# Patient Record
Sex: MALE | Race: WHITE | NOT HISPANIC OR LATINO
[De-identification: names, ages, dates, MRNs, and addresses within clinical notes are randomized per-mention and may not be internally consistent; named-entity substitution may affect disease eponyms.]

---

## 2018-09-18 ENCOUNTER — NON-APPOINTMENT (OUTPATIENT)
Age: 58
End: 2018-09-18

## 2018-09-18 ENCOUNTER — APPOINTMENT (OUTPATIENT)
Dept: CARDIOLOGY | Facility: CLINIC | Age: 58
End: 2018-09-18
Payer: MEDICAID

## 2018-09-18 ENCOUNTER — OUTPATIENT (OUTPATIENT)
Dept: OUTPATIENT SERVICES | Facility: HOSPITAL | Age: 58
LOS: 1 days | End: 2018-09-18

## 2018-09-18 ENCOUNTER — INPATIENT (INPATIENT)
Facility: HOSPITAL | Age: 58
LOS: 0 days | Discharge: HOME | End: 2018-09-19
Attending: FAMILY MEDICINE
Payer: MEDICAID

## 2018-09-18 VITALS — DIASTOLIC BLOOD PRESSURE: 98 MMHG | SYSTOLIC BLOOD PRESSURE: 150 MMHG

## 2018-09-18 VITALS — HEART RATE: 148 BPM | OXYGEN SATURATION: 95 % | BODY MASS INDEX: 33.88 KG/M2 | HEIGHT: 71 IN | WEIGHT: 242 LBS

## 2018-09-18 DIAGNOSIS — Z82.5 FAMILY HISTORY OF ASTHMA AND OTHER CHRONIC LOWER RESPIRATORY DISEASES: ICD-10-CM

## 2018-09-18 DIAGNOSIS — R53.83 OTHER FATIGUE: ICD-10-CM

## 2018-09-18 DIAGNOSIS — Z81.2 FAMILY HISTORY OF TOBACCO ABUSE AND DEPENDENCE: ICD-10-CM

## 2018-09-18 DIAGNOSIS — Z86.59 PERSONAL HISTORY OF OTHER MENTAL AND BEHAVIORAL DISORDERS: ICD-10-CM

## 2018-09-18 DIAGNOSIS — Z87.19 PERSONAL HISTORY OF OTHER DISEASES OF THE DIGESTIVE SYSTEM: ICD-10-CM

## 2018-09-18 DIAGNOSIS — M72.2 PLANTAR FASCIAL FIBROMATOSIS: ICD-10-CM

## 2018-09-18 PROCEDURE — 71045 X-RAY EXAM CHEST 1 VIEW: CPT | Mod: 26

## 2018-09-18 PROCEDURE — 99245 OFF/OP CONSLTJ NEW/EST HI 55: CPT

## 2018-09-18 PROCEDURE — 99254 IP/OBS CNSLTJ NEW/EST MOD 60: CPT

## 2018-09-18 PROCEDURE — 99291 CRITICAL CARE FIRST HOUR: CPT

## 2018-09-18 PROCEDURE — 93000 ELECTROCARDIOGRAM COMPLETE: CPT

## 2018-09-18 RX ORDER — LEVOTHYROXINE SODIUM 0.05 MG/1
50 TABLET ORAL DAILY
Refills: 0 | Status: ACTIVE | COMMUNITY

## 2018-09-19 ENCOUNTER — OUTPATIENT (OUTPATIENT)
Dept: OUTPATIENT SERVICES | Facility: HOSPITAL | Age: 58
LOS: 1 days | End: 2018-09-19

## 2018-09-19 PROCEDURE — 93320 DOPPLER ECHO COMPLETE: CPT | Mod: 26

## 2018-09-19 PROCEDURE — 99233 SBSQ HOSP IP/OBS HIGH 50: CPT | Mod: 25

## 2018-09-19 PROCEDURE — 93312 ECHO TRANSESOPHAGEAL: CPT | Mod: 26

## 2018-09-19 PROCEDURE — 92960 CARDIOVERSION ELECTRIC EXT: CPT

## 2018-10-04 ENCOUNTER — APPOINTMENT (OUTPATIENT)
Dept: CARDIOLOGY | Facility: CLINIC | Age: 58
End: 2018-10-04
Payer: MEDICAID

## 2018-10-04 ENCOUNTER — NON-APPOINTMENT (OUTPATIENT)
Age: 58
End: 2018-10-04

## 2018-10-04 VITALS
WEIGHT: 230 LBS | HEIGHT: 71 IN | SYSTOLIC BLOOD PRESSURE: 116 MMHG | HEART RATE: 87 BPM | BODY MASS INDEX: 32.2 KG/M2 | DIASTOLIC BLOOD PRESSURE: 74 MMHG | OXYGEN SATURATION: 97 %

## 2018-10-04 DIAGNOSIS — R06.02 SHORTNESS OF BREATH: ICD-10-CM

## 2018-10-04 DIAGNOSIS — R94.5 ABNORMAL RESULTS OF LIVER FUNCTION STUDIES: ICD-10-CM

## 2018-10-04 PROCEDURE — 93000 ELECTROCARDIOGRAM COMPLETE: CPT

## 2018-10-04 PROCEDURE — 99214 OFFICE O/P EST MOD 30 MIN: CPT

## 2018-10-04 RX ORDER — CETIRIZINE HYDROCHLORIDE 10 MG/1
10 TABLET, COATED ORAL
Refills: 0 | Status: ACTIVE | COMMUNITY

## 2018-10-04 RX ORDER — MULTIVITAMIN
TABLET ORAL
Refills: 0 | Status: ACTIVE | COMMUNITY

## 2018-10-18 ENCOUNTER — MEDICATION RENEWAL (OUTPATIENT)
Age: 58
End: 2018-10-18

## 2018-11-01 ENCOUNTER — APPOINTMENT (OUTPATIENT)
Dept: CARDIOLOGY | Facility: CLINIC | Age: 58
End: 2018-11-01
Payer: MEDICAID

## 2018-11-01 PROCEDURE — 93015 CV STRESS TEST SUPVJ I&R: CPT

## 2018-11-01 PROCEDURE — A9502: CPT

## 2018-11-01 PROCEDURE — 78452 HT MUSCLE IMAGE SPECT MULT: CPT

## 2018-11-08 ENCOUNTER — APPOINTMENT (OUTPATIENT)
Dept: ELECTROPHYSIOLOGY | Facility: CLINIC | Age: 58
End: 2018-11-08
Payer: MEDICAID

## 2018-11-08 VITALS
WEIGHT: 230 LBS | HEIGHT: 71 IN | DIASTOLIC BLOOD PRESSURE: 78 MMHG | BODY MASS INDEX: 32.2 KG/M2 | HEART RATE: 92 BPM | SYSTOLIC BLOOD PRESSURE: 122 MMHG

## 2018-11-08 PROCEDURE — 93000 ELECTROCARDIOGRAM COMPLETE: CPT

## 2018-11-08 PROCEDURE — 99244 OFF/OP CNSLTJ NEW/EST MOD 40: CPT

## 2018-11-08 NOTE — REASON FOR VISIT
[FreeTextEntry1] : Eze España\par \par \par 57 year old male with h/o :\par \par 1.	Pre-DM diet controlled\par 2.	 LFTs/fatty liver\par 3.	HTN \par 4.	Plantar fascitis \par 5.	Atrial Flutter with rate 153bpm s/p SCOTTY and DCCV\par 6.	SCOTTY revealed no evidence of intracardiac thrombi, with preserved EF there was mild MR, moderate TR and grade 1-2 atherosclerosis of the aorta.\par \par pt lives in [place]. 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   His chest felt like a "washer machine".   He has also noticed on and off mild shortness of breath intermittently describes as easily winded.  No chest pain, dizziness, lightheadedness, syncope or edema. \par \par No PND, orthopnea, wheeze.\par \par He had history of ETOH abuse associated with his anxiety but denies any recent ETOH use.  Used OTC allergy medication withOUT decongestant, denies stimulant or caffeine use.   Denies drug use.\par He reports history of sleep apnea but states never had sleep study.\par \par  \par \par  Typical atrial flutter, had subsequent SCOTTY and DCCV, which was successful. \par \par SCOTTY revealed no evidence of intracardiac thrombi, with preserved EF there was mild MR, moderate TR and grade 1-2 atherosclerosis of the aorta.\par \par Labs revealed negative troponins, no significant anemia, there was significant elevation of his LFTs which is chronic for him. There was also mild elevation in TSH for which he is already on level thyroxine.\par \par \par \par He is tolerating Eliquis well without any bleeding or bruising complications.\par \par He is seeking to perhaps reduce some of his medications now that he has had successful cardioversion\par \par

## 2018-11-08 NOTE — ASSESSMENT
[FreeTextEntry1] : 57 year old with Pre-DM, ALISON?, HTN, hypothyroid with recent  Atrial flutter, now in NSR after DCCV remains s in SR no complaints.\par [id number] =1 (HTN) \par Recc:\par stbale for D/c of Eliquis\par LINQ monitor to determine need for Ablation and occult flutter recurrence\par CT Metoprolol 25 mg po daily\par

## 2018-11-08 NOTE — REVIEW OF SYSTEMS
[see HPI] : see HPI [Dyspnea on exertion] : dyspnea during exertion [Negative] : Heme/Lymph [Fever] : no fever [Headache] : no headache [Chills] : no chills [Feeling Fatigued] : not feeling fatigued [Earache] : no earache [Loss Of Hearing] : no hearing loss [Shortness Of Breath] : no shortness of breath [Chest  Pressure] : no chest pressure [Lower Ext Edema] : no extremity edema [Palpitations] : no palpitations

## 2018-11-08 NOTE — PHYSICAL EXAM
[General Appearance - Well Developed] : well developed [Normal Appearance] : normal appearance [Well Groomed] : well groomed [General Appearance - Well Nourished] : well nourished [No Deformities] : no deformities [General Appearance - In No Acute Distress] : no acute distress [Normal Conjunctiva] : the conjunctiva exhibited no abnormalities [Eyelids - No Xanthelasma] : the eyelids demonstrated no xanthelasmas [Normal Oral Mucosa] : normal oral mucosa [No Oral Pallor] : no oral pallor [No Oral Cyanosis] : no oral cyanosis [Normal Jugular Venous A Waves Present] : normal jugular venous A waves present [Normal Jugular Venous V Waves Present] : normal jugular venous V waves present [No Jugular Venous Gimenez A Waves] : no jugular venous gimenez A waves [Arterial Pulses Normal] : the arterial pulses were normal [Edema] : no peripheral edema present [Respiration, Rhythm And Depth] : normal respiratory rhythm and effort [Exaggerated Use Of Accessory Muscles For Inspiration] : no accessory muscle use [Lungs Percussion] : the lungs were normal to percussion [Bowel Sounds] : normal bowel sounds [Abdomen Soft] : soft [Abdomen Tenderness] : non-tender [Abdomen Mass (___ Cm)] : no abdominal mass palpated [Abnormal Walk] : normal gait [Gait - Sufficient For Exercise Testing] : the gait was sufficient for exercise testing [Skin Color & Pigmentation] : normal skin color and pigmentation [Skin Turgor] : normal skin turgor [] : no rash [Oriented To Time, Place, And Person] : oriented to person, place, and time [Affect] : the affect was normal [Mood] : the mood was normal [No Anxiety] : not feeling anxious [FreeTextEntry1] : Very pleasant

## 2018-11-13 ENCOUNTER — APPOINTMENT (OUTPATIENT)
Dept: CARDIOLOGY | Facility: CLINIC | Age: 58
End: 2018-11-13
Payer: MEDICAID

## 2018-11-13 VITALS
DIASTOLIC BLOOD PRESSURE: 88 MMHG | HEIGHT: 71 IN | WEIGHT: 230 LBS | BODY MASS INDEX: 32.2 KG/M2 | SYSTOLIC BLOOD PRESSURE: 138 MMHG | HEART RATE: 80 BPM

## 2018-11-13 DIAGNOSIS — E03.9 HYPOTHYROIDISM, UNSPECIFIED: ICD-10-CM

## 2018-11-13 PROCEDURE — 99214 OFFICE O/P EST MOD 30 MIN: CPT

## 2018-11-13 NOTE — REASON FOR VISIT
[FreeTextEntry1] : 57 year old male with past medical history of pre-DM diet controlled, elevated LFTs/fatty liver,  HTN recently placed on BP medication.    Pt  is also being treated for plantar fascitis and while on meloxicam noticed worsening of BP.  He has recent diagnosis of ALISON, pending CPAP.\par \par He presented intially for tachycardia and HTN with BP reading of "160/108". \par EKG from PMD office revealed Atrial Flutter with rate 153bpm. \par EKG repeated in our office- A. Flutter PVCs with uncontrolled torey. rate 154bpm.\par \par Pt admited he had a one month history that he has been feeling more fatigued and lethargic.   His chest felt like a "washer machine".   He has also noticed on and off mild shortness of breath intermittently describes as easily winded.  No chest pain, dizziness, lightheadedness, syncope or edema. \par \par No PND, orthopnea, wheeze.\par \par He had history of ETOH abuse associated with his anxiety but denies any recent ETOH use.  Used OTC allergy medication withOUT decongestant, denies stimulant or caffeine use.   Denies drug use.\par \par \par  \par No history of CVA, MI, CHF.\par \par \par Following evaluation in our office he was sent to [facility] where he was noted to be in typical atrial flutter, had subsequent SCOTTY and DCCV, which was successful. \par \par SCOTTY revealed no evidence of intracardiac thrombi, with preserved EF there was mild MR, moderate TR and grade 1-2 atherosclerosis of the aorta.\par \par Labs revealed negative troponins, no significant anemia, there was significant elevation of his LFTs which is chronic for him. There was also mild elevation in TSH for which he is already on level thyroxine.\par \par In followup EKG revealed normal sinus rhythm.\par \par He reports interval significant improvement in his symptoms and is no longer dyspneic, nor having palpitations. He is relatively sedentary person, and is not having chest discomfort with exertion.\par \par He denies other symptoms to include dizziness, syncope or near syncope, PND, orthopnea, jaw pain.\par \par In the interim he had consultation with Dr. Gomes. NOAC has been replaced with ASA, and is planned for ILR to determine if there is occult a. flutter, and determine the need for future rhythm strategy.\par \par Nuclear stress testing November 1, 2018 was negative for ischemia.  \par \par Social hx: pt lives in [place].  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

## 2018-11-13 NOTE — ASSESSMENT
[FreeTextEntry1] : 58 year old with Pre-DM, ALISON?, HTN, hypothyroid with recent  Atrial flutter, now in NSR after DCCV and normal SCOTTY.\par \par CHADS VASC 2 score 1 now on ASA.\par \par \par He was seen by electrophysiology, Dr. Gomes, and will have ILR implant and subsequent evaluation discuss rhythm guided strategy if there is occult AF/flutter.\par \par Hypertension is presently well controlled. Continue metoprolol tartrate to 25 mg b.i.d. He reports BP readings at home are well controlled. \par \par Given significant LFT elevation, I have recommended that he discontinue or extensively limit his use of nonsteroidal anti-inflammatories with reassessment of his LFTs thereafter. He will pursue this with his PCP.\par \par Sleep apnea, will start CPAP next week.\par \par He should also avoid any stimulant substances, alcohol, caffeine, decongestants.\par \par Continue followup with his primary care physician for monitoring of prediabetes.\par \par He will follow up in the next 4-6 weeks or as needed. \par

## 2018-11-13 NOTE — REVIEW OF SYSTEMS
[see HPI] : see HPI [Negative] : Heme/Lymph [Fever] : no fever [Headache] : no headache [Chills] : no chills [Feeling Fatigued] : not feeling fatigued [Earache] : no earache [Loss Of Hearing] : no hearing loss [Shortness Of Breath] : no shortness of breath [Dyspnea on exertion] : not dyspnea during exertion [Chest  Pressure] : no chest pressure [Lower Ext Edema] : no extremity edema [Palpitations] : no palpitations

## 2018-11-13 NOTE — PHYSICAL EXAM
[General Appearance - Well Developed] : well developed [Normal Appearance] : normal appearance [Well Groomed] : well groomed [General Appearance - Well Nourished] : well nourished [No Deformities] : no deformities [General Appearance - In No Acute Distress] : no acute distress [Normal Conjunctiva] : the conjunctiva exhibited no abnormalities [Eyelids - No Xanthelasma] : the eyelids demonstrated no xanthelasmas [Normal Oral Mucosa] : normal oral mucosa [No Oral Pallor] : no oral pallor [No Oral Cyanosis] : no oral cyanosis [Normal Jugular Venous A Waves Present] : normal jugular venous A waves present [Normal Jugular Venous V Waves Present] : normal jugular venous V waves present [No Jugular Venous Gimenez A Waves] : no jugular venous gimenez A waves [Respiration, Rhythm And Depth] : normal respiratory rhythm and effort [Exaggerated Use Of Accessory Muscles For Inspiration] : no accessory muscle use [Lungs Percussion] : the lungs were normal to percussion [Arterial Pulses Normal] : the arterial pulses were normal [Edema] : no peripheral edema present [Bowel Sounds] : normal bowel sounds [Abdomen Soft] : soft [Abdomen Tenderness] : non-tender [Abdomen Mass (___ Cm)] : no abdominal mass palpated [Abnormal Walk] : normal gait [Gait - Sufficient For Exercise Testing] : the gait was sufficient for exercise testing [Skin Color & Pigmentation] : normal skin color and pigmentation [Skin Turgor] : normal skin turgor [] : no rash [Oriented To Time, Place, And Person] : oriented to person, place, and time [Affect] : the affect was normal [Mood] : the mood was normal [No Anxiety] : not feeling anxious [FreeTextEntry1] : Very pleasant

## 2018-12-19 ENCOUNTER — OUTPATIENT (OUTPATIENT)
Dept: OUTPATIENT SERVICES | Facility: HOSPITAL | Age: 58
LOS: 1 days | End: 2018-12-19
Payer: MEDICAID

## 2018-12-19 PROCEDURE — 33282: CPT

## 2018-12-20 RX ORDER — ASPIRIN 81 MG
81 TABLET, DELAYED RELEASE (ENTERIC COATED) ORAL
Refills: 0 | Status: DISCONTINUED | COMMUNITY
End: 2018-12-20

## 2019-01-04 ENCOUNTER — APPOINTMENT (OUTPATIENT)
Dept: CARDIOLOGY | Facility: CLINIC | Age: 59
End: 2019-01-04
Payer: MEDICAID

## 2019-01-04 PROCEDURE — 93291 INTERROG DEV EVAL SCRMS IP: CPT

## 2019-01-04 NOTE — PROCEDURE
[de-identified] : NADIYA العراقيQ\par \par Date of implant 12-19-18\par Dx: A flutter\par \par Settings:\par Tachy 171 x 16 beats\par Pause x 3 s\par Roberto 30 bpm x 4 beats\par AF on \par \par Findings:\par Episodes 1-12 viewed, all pauses in sleep, up to 5 s.\par Will reduced metoprolol to 25 mg BID ( was recently increased due to HTN.)\par \par Check BP in 2 weeks.\par Continue to monitor monthly.\par \par

## 2019-01-10 ENCOUNTER — APPOINTMENT (OUTPATIENT)
Dept: ELECTROPHYSIOLOGY | Facility: CLINIC | Age: 59
End: 2019-01-10

## 2019-01-28 ENCOUNTER — OUTPATIENT (OUTPATIENT)
Dept: OUTPATIENT SERVICES | Facility: HOSPITAL | Age: 59
LOS: 1 days | End: 2019-01-28
Payer: MEDICAID

## 2019-01-28 VITALS
RESPIRATION RATE: 18 BRPM | HEART RATE: 92 BPM | HEIGHT: 71 IN | TEMPERATURE: 98 F | SYSTOLIC BLOOD PRESSURE: 167 MMHG | WEIGHT: 235.89 LBS | OXYGEN SATURATION: 94 % | DIASTOLIC BLOOD PRESSURE: 96 MMHG

## 2019-01-28 VITALS
DIASTOLIC BLOOD PRESSURE: 96 MMHG | OXYGEN SATURATION: 94 % | TEMPERATURE: 98 F | HEART RATE: 92 BPM | RESPIRATION RATE: 18 BRPM | SYSTOLIC BLOOD PRESSURE: 167 MMHG

## 2019-01-28 DIAGNOSIS — Z90.89 ACQUIRED ABSENCE OF OTHER ORGANS: Chronic | ICD-10-CM

## 2019-01-28 DIAGNOSIS — I48.92 UNSPECIFIED ATRIAL FLUTTER: ICD-10-CM

## 2019-01-28 DIAGNOSIS — Z98.890 OTHER SPECIFIED POSTPROCEDURAL STATES: Chronic | ICD-10-CM

## 2019-01-28 DIAGNOSIS — Z01.818 ENCOUNTER FOR OTHER PREPROCEDURAL EXAMINATION: ICD-10-CM

## 2019-01-28 LAB
ALBUMIN SERPL ELPH-MCNC: 4.2 G/DL — SIGNIFICANT CHANGE UP (ref 3.3–5.2)
ALP SERPL-CCNC: 64 U/L — SIGNIFICANT CHANGE UP (ref 40–120)
ALT FLD-CCNC: 48 U/L — HIGH
ANION GAP SERPL CALC-SCNC: 16 MMOL/L — SIGNIFICANT CHANGE UP (ref 5–17)
APTT BLD: 34.6 SEC — SIGNIFICANT CHANGE UP (ref 27.5–36.3)
AST SERPL-CCNC: 31 U/L — SIGNIFICANT CHANGE UP
BILIRUB SERPL-MCNC: 0.4 MG/DL — SIGNIFICANT CHANGE UP (ref 0.4–2)
BLD GP AB SCN SERPL QL: SIGNIFICANT CHANGE UP
BUN SERPL-MCNC: 25 MG/DL — HIGH (ref 8–20)
CALCIUM SERPL-MCNC: 9.3 MG/DL — SIGNIFICANT CHANGE UP (ref 8.6–10.2)
CHLORIDE SERPL-SCNC: 97 MMOL/L — LOW (ref 98–107)
CO2 SERPL-SCNC: 26 MMOL/L — SIGNIFICANT CHANGE UP (ref 22–29)
CREAT SERPL-MCNC: 0.84 MG/DL — SIGNIFICANT CHANGE UP (ref 0.5–1.3)
GLUCOSE SERPL-MCNC: 125 MG/DL — HIGH (ref 70–115)
HCT VFR BLD CALC: 45.5 % — SIGNIFICANT CHANGE UP (ref 42–52)
HGB BLD-MCNC: 15.7 G/DL — SIGNIFICANT CHANGE UP (ref 14–18)
INR BLD: 1.22 RATIO — HIGH (ref 0.88–1.16)
MAGNESIUM SERPL-MCNC: 2 MG/DL — SIGNIFICANT CHANGE UP (ref 1.8–2.6)
MCHC RBC-ENTMCNC: 33.1 PG — HIGH (ref 27–31)
MCHC RBC-ENTMCNC: 34.5 G/DL — SIGNIFICANT CHANGE UP (ref 32–36)
MCV RBC AUTO: 96 FL — HIGH (ref 80–94)
PLATELET # BLD AUTO: 240 K/UL — SIGNIFICANT CHANGE UP (ref 150–400)
POTASSIUM SERPL-MCNC: 3.4 MMOL/L — LOW (ref 3.5–5.3)
POTASSIUM SERPL-SCNC: 3.4 MMOL/L — LOW (ref 3.5–5.3)
PROT SERPL-MCNC: 7.2 G/DL — SIGNIFICANT CHANGE UP (ref 6.6–8.7)
PROTHROM AB SERPL-ACNC: 14.1 SEC — HIGH (ref 10–12.9)
RBC # BLD: 4.74 M/UL — SIGNIFICANT CHANGE UP (ref 4.6–6.2)
RBC # FLD: 12.2 % — SIGNIFICANT CHANGE UP (ref 11–15.6)
SODIUM SERPL-SCNC: 139 MMOL/L — SIGNIFICANT CHANGE UP (ref 135–145)
TYPE + AB SCN PNL BLD: SIGNIFICANT CHANGE UP
WBC # BLD: 8.8 K/UL — SIGNIFICANT CHANGE UP (ref 4.8–10.8)
WBC # FLD AUTO: 8.8 K/UL — SIGNIFICANT CHANGE UP (ref 4.8–10.8)

## 2019-01-28 PROCEDURE — 36415 COLL VENOUS BLD VENIPUNCTURE: CPT

## 2019-01-28 PROCEDURE — G0463: CPT

## 2019-01-28 PROCEDURE — 93005 ELECTROCARDIOGRAM TRACING: CPT

## 2019-01-28 PROCEDURE — 86900 BLOOD TYPING SEROLOGIC ABO: CPT

## 2019-01-28 PROCEDURE — 85730 THROMBOPLASTIN TIME PARTIAL: CPT

## 2019-01-28 PROCEDURE — 85610 PROTHROMBIN TIME: CPT

## 2019-01-28 PROCEDURE — 86850 RBC ANTIBODY SCREEN: CPT

## 2019-01-28 PROCEDURE — 83735 ASSAY OF MAGNESIUM: CPT

## 2019-01-28 PROCEDURE — 93010 ELECTROCARDIOGRAM REPORT: CPT

## 2019-01-28 PROCEDURE — 80053 COMPREHEN METABOLIC PANEL: CPT

## 2019-01-28 PROCEDURE — 86901 BLOOD TYPING SEROLOGIC RH(D): CPT

## 2019-01-28 PROCEDURE — 85027 COMPLETE CBC AUTOMATED: CPT

## 2019-01-28 RX ORDER — METOPROLOL TARTRATE 50 MG
1 TABLET ORAL
Qty: 0 | Refills: 0 | COMMUNITY

## 2019-01-28 NOTE — H&P PST ADULT - ATTENDING COMMENTS
57 yo gentleman w/pmh of HTN, obstructive sleep apnea, pre-DM diet controlled, depression, anxiety, hypothyroidism and recently diagnosed atrial flutter hospitalized at [facility] 11/2018.  He is s/p successful SCOTTY/DCCV (11/2018) with subsequent ILR implanted 12/19/18 ([facility]) for long term arrhythmia surveillance.  Pt presents today for PST for elective atrial flutter ablation secondary to poorly controlled recurrent atrial flutter.  Pt denies any complaints at this time.  Denies headache, dizziness, chest pain, palpitation, shortness of breath.       Patient stable for planned procedure.    I had a lengthy discussion with the patient in lay terms regarding the various treatment options available for this condition. The details and benefits of the procedure were explained. The risks including but not limited to, bleeding, heart/lung/vessel damage, respiratory distress, stroke, blood clot, infection, lead dislodgement, equipment malfunction/device recall, skin damage, heart attack, drug reaction, alternation of heart rhythm, cardiac arrest and even death were discussed in great detail. In addition, it was made clear to the patient that any of the above complications may require intervention or a surgical repair and may prolong hospital stay and/or potentially result in permanent impairment. During this conversation, the patient demonstrated a clear understanding of the facts, implications and consequences of the procedure. The patient had ample opportunities to ask questions. The patient decided to go ahead with the procedure rather than with alternative therapies.

## 2019-01-28 NOTE — H&P PST ADULT - PMH
Anxiety    Depression, unspecified depression type    Hypertension, unspecified type    Hypothyroidism, unspecified type    Obstructive sleep apnea

## 2019-01-28 NOTE — H&P PST ADULT - ASSESSMENT
59 yo gentleman w/pmh of HTN, obstructive sleep apnea, pre-DM diet controlled, depression, anxiety, hypothyroidism and recently diagnosed atrial flutter hospitalized at [facility] 11/2018.  He is s/p successful SCOTTY/DCCV (11/2018) with subsequent ILR implanted 12/19/18 ([facility]) for long term arrhythmia surveillance.  Pt presents today for PST for elective atrial flutter ablation secondary to reported recurrent atrial flutter.  Pt is maintained on metoprolol and eliquis.      Plan:   -NPO after midnight on 1/31/19 for procedure on 2/1/19 @ 8am  -Pt advised last dose of eliquis on 1/31/19 morning, no bridge    -SCOTTY on table  -bring CPAP day of procedure

## 2019-01-28 NOTE — H&P PST ADULT - HISTORY OF PRESENT ILLNESS
59 yo gentleman w/pmh of HTN, obstructive sleep apnea, pre-DM diet controlled, depression, anxiety, hypothyroidism and recently diagnosed atrial flutter hospitalized at [facility] 11/2018.  He is s/p ssuccessful SCOTTY/DCCV (11/2018) with subsequent ILR implanted 12/19/18 ([facility]) for long term arrhythmia surveillance.  Pt presents today for PST for elective atrial flutter ablation secondary to reported recurrent atrial flutter.  Pt denies any complaints at this time.  Denies headache, dizziness, chest pain, palpitation, shortness of breath. 57 yo gentleman w/pmh of HTN, obstructive sleep apnea, pre-DM diet controlled, depression, anxiety, hypothyroidism and recently diagnosed atrial flutter hospitalized at [facility] 11/2018.  He is s/p ssuccessful SCOTTY/DCCV (11/2018) with subsequent ILR implanted 12/19/18 ([facility]) for long term arrhythmia surveillance.  Pt presents today for PST for elective atrial flutter ablation secondary to poorly controlled recurrent atrial flutter.  Pt denies any complaints at this time.  Denies headache, dizziness, chest pain, palpitation, shortness of breath.

## 2019-02-01 ENCOUNTER — INPATIENT (INPATIENT)
Facility: HOSPITAL | Age: 59
LOS: 0 days | Discharge: HOME | DRG: 274 | End: 2019-02-02
Attending: INTERNAL MEDICINE | Admitting: INTERNAL MEDICINE
Payer: MEDICAID

## 2019-02-01 ENCOUNTER — TRANSCRIPTION ENCOUNTER (OUTPATIENT)
Age: 59
End: 2019-02-01

## 2019-02-01 VITALS
SYSTOLIC BLOOD PRESSURE: 166 MMHG | RESPIRATION RATE: 18 BRPM | TEMPERATURE: 98 F | OXYGEN SATURATION: 96 % | DIASTOLIC BLOOD PRESSURE: 105 MMHG | HEART RATE: 92 BPM

## 2019-02-01 DIAGNOSIS — Z90.89 ACQUIRED ABSENCE OF OTHER ORGANS: Chronic | ICD-10-CM

## 2019-02-01 DIAGNOSIS — I48.92 UNSPECIFIED ATRIAL FLUTTER: ICD-10-CM

## 2019-02-01 DIAGNOSIS — Z98.890 OTHER SPECIFIED POSTPROCEDURAL STATES: Chronic | ICD-10-CM

## 2019-02-01 DIAGNOSIS — Z01.818 ENCOUNTER FOR OTHER PREPROCEDURAL EXAMINATION: ICD-10-CM

## 2019-02-01 LAB
ABO RH CONFIRMATION: SIGNIFICANT CHANGE UP
ANION GAP SERPL CALC-SCNC: 18 MMOL/L — HIGH (ref 5–17)
BUN SERPL-MCNC: 20 MG/DL — SIGNIFICANT CHANGE UP (ref 8–20)
CALCIUM SERPL-MCNC: 10 MG/DL — SIGNIFICANT CHANGE UP (ref 8.6–10.2)
CHLORIDE SERPL-SCNC: 99 MMOL/L — SIGNIFICANT CHANGE UP (ref 98–107)
CO2 SERPL-SCNC: 25 MMOL/L — SIGNIFICANT CHANGE UP (ref 22–29)
CREAT SERPL-MCNC: 0.76 MG/DL — SIGNIFICANT CHANGE UP (ref 0.5–1.3)
GLUCOSE SERPL-MCNC: 127 MG/DL — HIGH (ref 70–115)
POTASSIUM SERPL-MCNC: 3.9 MMOL/L — SIGNIFICANT CHANGE UP (ref 3.5–5.3)
POTASSIUM SERPL-SCNC: 3.9 MMOL/L — SIGNIFICANT CHANGE UP (ref 3.5–5.3)
SODIUM SERPL-SCNC: 142 MMOL/L — SIGNIFICANT CHANGE UP (ref 135–145)

## 2019-02-01 PROCEDURE — 93613 INTRACARDIAC EPHYS 3D MAPG: CPT | Mod: 78

## 2019-02-01 PROCEDURE — 93655 ICAR CATH ABLTJ DSCRT ARRHYT: CPT | Mod: 78

## 2019-02-01 PROCEDURE — 93653 COMPRE EP EVAL TX SVT: CPT

## 2019-02-01 PROCEDURE — 93010 ELECTROCARDIOGRAM REPORT: CPT

## 2019-02-01 PROCEDURE — 93621 COMP EP EVL L PAC&REC C SINS: CPT | Mod: 26

## 2019-02-01 RX ORDER — LEVOTHYROXINE SODIUM 125 MCG
1 TABLET ORAL
Qty: 0 | Refills: 0 | COMMUNITY

## 2019-02-01 RX ORDER — METOPROLOL TARTRATE 50 MG
25 TABLET ORAL
Qty: 0 | Refills: 0 | Status: DISCONTINUED | OUTPATIENT
Start: 2019-02-01 | End: 2019-02-02

## 2019-02-01 RX ORDER — BENZOCAINE AND MENTHOL 5; 1 G/100ML; G/100ML
1 LIQUID ORAL
Qty: 0 | Refills: 0 | Status: DISCONTINUED | OUTPATIENT
Start: 2019-02-01 | End: 2019-02-02

## 2019-02-01 RX ORDER — MIRTAZAPINE 45 MG/1
1 TABLET, ORALLY DISINTEGRATING ORAL
Qty: 0 | Refills: 0 | COMMUNITY

## 2019-02-01 RX ORDER — ACETAMINOPHEN 500 MG
650 TABLET ORAL EVERY 6 HOURS
Qty: 0 | Refills: 0 | Status: DISCONTINUED | OUTPATIENT
Start: 2019-02-01 | End: 2019-02-02

## 2019-02-01 RX ORDER — GABAPENTIN 400 MG/1
300 CAPSULE ORAL THREE TIMES A DAY
Qty: 0 | Refills: 0 | Status: DISCONTINUED | OUTPATIENT
Start: 2019-02-01 | End: 2019-02-02

## 2019-02-01 RX ORDER — FUROSEMIDE 40 MG
10 TABLET ORAL ONCE
Qty: 0 | Refills: 0 | Status: COMPLETED | OUTPATIENT
Start: 2019-02-01 | End: 2019-02-01

## 2019-02-01 RX ORDER — LEVOTHYROXINE SODIUM 125 MCG
50 TABLET ORAL DAILY
Qty: 0 | Refills: 0 | Status: DISCONTINUED | OUTPATIENT
Start: 2019-02-01 | End: 2019-02-02

## 2019-02-01 RX ORDER — CHLORTHALIDONE 50 MG
1 TABLET ORAL
Qty: 0 | Refills: 0 | COMMUNITY

## 2019-02-01 RX ORDER — FENTANYL CITRATE 50 UG/ML
50 INJECTION INTRAVENOUS
Qty: 0 | Refills: 0 | Status: DISCONTINUED | OUTPATIENT
Start: 2019-02-01 | End: 2019-02-02

## 2019-02-01 RX ORDER — APIXABAN 2.5 MG/1
5 TABLET, FILM COATED ORAL
Qty: 0 | Refills: 0 | Status: DISCONTINUED | OUTPATIENT
Start: 2019-02-01 | End: 2019-02-02

## 2019-02-01 RX ORDER — METOPROLOL TARTRATE 50 MG
1 TABLET ORAL
Qty: 0 | Refills: 0 | COMMUNITY

## 2019-02-01 RX ORDER — HYDROCHLOROTHIAZIDE 25 MG
25 TABLET ORAL DAILY
Qty: 0 | Refills: 0 | Status: DISCONTINUED | OUTPATIENT
Start: 2019-02-01 | End: 2019-02-02

## 2019-02-01 RX ORDER — OMEGA-3 ACID ETHYL ESTERS 1 G
1 CAPSULE ORAL
Qty: 0 | Refills: 0 | COMMUNITY

## 2019-02-01 RX ORDER — APIXABAN 2.5 MG/1
1 TABLET, FILM COATED ORAL
Qty: 0 | Refills: 0 | COMMUNITY

## 2019-02-01 RX ORDER — ALPRAZOLAM 0.25 MG
0.25 TABLET ORAL EVERY 6 HOURS
Qty: 0 | Refills: 0 | Status: DISCONTINUED | OUTPATIENT
Start: 2019-02-01 | End: 2019-02-02

## 2019-02-01 RX ORDER — GABAPENTIN 400 MG/1
1 CAPSULE ORAL
Qty: 0 | Refills: 0 | COMMUNITY

## 2019-02-01 RX ORDER — MIRTAZAPINE 45 MG/1
15 TABLET, ORALLY DISINTEGRATING ORAL AT BEDTIME
Qty: 0 | Refills: 0 | Status: DISCONTINUED | OUTPATIENT
Start: 2019-02-01 | End: 2019-02-02

## 2019-02-01 RX ADMIN — APIXABAN 5 MILLIGRAM(S): 2.5 TABLET, FILM COATED ORAL at 17:58

## 2019-02-01 RX ADMIN — Medication 25 MILLIGRAM(S): at 17:59

## 2019-02-01 RX ADMIN — Medication 10 MILLIGRAM(S): at 17:58

## 2019-02-01 RX ADMIN — MIRTAZAPINE 15 MILLIGRAM(S): 45 TABLET, ORALLY DISINTEGRATING ORAL at 23:28

## 2019-02-01 NOTE — DISCHARGE NOTE ADULT - CARE PROVIDER_API CALL
Dalton Gomes (MD; MPH)  Cardiac Electrophysiology; Cardiovascular Disease; Internal Medicine  [address] [place] [address] [id number]  Phone: [phone]  Fax: [phone]

## 2019-02-01 NOTE — PROGRESS NOTE ADULT - SUBJECTIVE AND OBJECTIVE BOX
Pt presents for elective AFL ablation. Denies changes in health status or medication regimen to date.   Pt does report he drove himself here. SW contact - pt is eligible for transport home via cab through Medicaid. He states he will be able to get a ride to  his car later in the week.   Pt is also noted to be in sinus rhythm, which may eliminate the need for SCOTTY.   Will discuss above w/ Dr. Gomes.

## 2019-02-01 NOTE — PROGRESS NOTE ADULT - SUBJECTIVE AND OBJECTIVE BOX
PROCEDURE(S): Radiofrequency Ablation of Typical Atrial Flutter and Right Atrial Tachycardia    ELECTRPHYSIOLOGIST(S): Dalton Gomes MD         COMPLICATIONS:  none        DISPOSITION:  observation     CONDITION: stable      Pt doing well s/p atrial flutter and belen tach ablation via right femoral venous access. Denies complaint.       MEDICATIONS  (STANDING):  apixaban 5 milliGRAM(s) Oral <User Schedule>  furosemide   Injectable 10 milliGRAM(s) IV Push once  hydrochlorothiazide 25 milliGRAM(s) Oral daily  levothyroxine 50 MICROGram(s) Oral daily  metoprolol tartrate 25 milliGRAM(s) Oral two times a day  mirtazapine 15 milliGRAM(s) Oral at bedtime    MEDICATIONS  (PRN):  acetaminophen   Tablet .. 650 milliGRAM(s) Oral every 6 hours PRN Mild Pain (1 - 3)  ALPRAZolam 0.25 milliGRAM(s) Oral every 6 hours PRN anxiety and/or insomnia  aluminum hydroxide/magnesium hydroxide/simethicone Suspension 30 milliLiter(s) Oral every 4 hours PRN Dyspepsia  benzocaine 15 mG/menthol 3.6 mG (Sugar-Free) Lozenge 1 Lozenge Oral every 2 hours PRN Sore Throat  fentaNYL    Injectable 50 MICROGram(s) IV Push every 30 minutes PRN Severe Pain (7 - 10)  gabapentin 300 milliGRAM(s) Oral three times a day PRN nerve pain      Allergies  No Known Allergies      Exam:   T(C): 36.7 (02-01-19 @ 07:50), Max: 36.7 (02-01-19 @ 07:50)  HR: 95 (02-01-19 @ 14:30) (86 - 96)  BP: 127/78 (02-01-19 @ 14:30) (90/65 - 166/105)  RR: 14 (02-01-19 @ 14:30) (14 - 18)  SpO2: 94% (02-01-19 @ 14:30) (94% - 98%)  Wt(kg): --  VSS, NAD, A&O x 3  Card: S1/S2, RRR, no m/g/r  Resp: lungs CTA b/l  Abd: S/NT/ND  Groins: hemostatic sutures in place; sites C/D/I; no bleeding, hematoma, erythema, exudate or edema  Ext: no edema; distal pulses intact      ECG: sinus rhythm w/ intact conduction, incomplete RBBB; no acute changes.     Assessment:   59 yo gentleman w/pmh of HTN, obstructive sleep apnea, pre-DM diet controlled, depression, anxiety, hypothyroidism and recently diagnosed atrial flutter hospitalized at [facility] 11/2018.  He is s/p successful SCOTTY/DCCV (11/2018) with subsequent ILR implanted 12/19/18 ([facility]) for long term arrhythmia surveillance. Now status post uncomplicated radiofrequency ablation of atrial fibrillation.      Plan:   Bedrest x 4 hours, then OOB with assistance and progress as tolerated.   Lasix 10mg IV x 1 dose once pt able to ambulate.   Groin suture to be removed by EP service in AM.   Pending groin status: Eliquis 5mg PO q 12 hours to resume at 1800 tonight.   Continue other home medications.   Strict I/Os.  Please encourage incentive spirometry and ambulation once able.  Observation and monitoring on telemetry overnight with anticipated discharge in the AM and outpt follow up in 2-4 weeks. PROCEDURE(S): Radiofrequency Ablation of Typical Atrial Flutter and Right Atrial Tachycardia    ELECTRPHYSIOLOGIST(S): Dalton Gomes MD         COMPLICATIONS:  none        DISPOSITION:  observation     CONDITION: stable      Pt doing well s/p atrial flutter and belen tach ablation via right femoral venous access. Denies complaint.       MEDICATIONS  (STANDING):  apixaban 5 milliGRAM(s) Oral <User Schedule>  furosemide   Injectable 10 milliGRAM(s) IV Push once  hydrochlorothiazide 25 milliGRAM(s) Oral daily  levothyroxine 50 MICROGram(s) Oral daily  metoprolol tartrate 25 milliGRAM(s) Oral two times a day  mirtazapine 15 milliGRAM(s) Oral at bedtime    MEDICATIONS  (PRN):  acetaminophen   Tablet .. 650 milliGRAM(s) Oral every 6 hours PRN Mild Pain (1 - 3)  ALPRAZolam 0.25 milliGRAM(s) Oral every 6 hours PRN anxiety and/or insomnia  aluminum hydroxide/magnesium hydroxide/simethicone Suspension 30 milliLiter(s) Oral every 4 hours PRN Dyspepsia  benzocaine 15 mG/menthol 3.6 mG (Sugar-Free) Lozenge 1 Lozenge Oral every 2 hours PRN Sore Throat  fentaNYL    Injectable 50 MICROGram(s) IV Push every 30 minutes PRN Severe Pain (7 - 10)  gabapentin 300 milliGRAM(s) Oral three times a day PRN nerve pain      Allergies  No Known Allergies      Exam:   T(C): 36.7 (02-01-19 @ 07:50), Max: 36.7 (02-01-19 @ 07:50)  HR: 95 (02-01-19 @ 14:30) (86 - 96)  BP: 127/78 (02-01-19 @ 14:30) (90/65 - 166/105)  RR: 14 (02-01-19 @ 14:30) (14 - 18)  SpO2: 94% (02-01-19 @ 14:30) (94% - 98%)    VSS, NAD, A&O x 3  Card: S1/S2, RRR, no m/g/r  Resp: lungs CTA b/l  Abd: S/NT/ND  Groin: hemostatic suture in place; sites C/D/I; no bleeding, hematoma, erythema, exudate or edema  Ext: no edema; distal pulses intact      ECG: sinus rhythm w/ intact conduction, incomplete RBBB; no acute changes.     Assessment:   57 yo gentleman w/pmh of HTN, obstructive sleep apnea, pre-DM (diet controlled), depression, anxiety, hypothyroidism and recently diagnosed atrial flutter s/p SCOTTY/DCCV (11/2018)and ILR implanted (12/19/18). On routine ILR interrogation, he was noted to have recurrent paroxysmal atrial flutter. Now status post uncomplicated radiofrequency ablation of atrial flutter and right atrial (belen) tach.       Plan:   Bedrest x 4 hours, then OOB with assistance and progress as tolerated.   Lasix 10mg IV x 1 dose once pt able to ambulate.   Groin suture to be removed by EP service in AM.   Pending groin status: Eliquis 5mg PO q 12 hours to resume at 1800 tonight.   Continue other home medications.   Strict I/Os.  Please encourage incentive spirometry and ambulation once able.  Observation and monitoring on telemetry overnight with anticipated discharge in the AM and outpt follow up in 2-4 weeks.

## 2019-02-01 NOTE — DISCHARGE NOTE ADULT - PATIENT PORTAL LINK FT
You can access the [facility] Patient Portal, offered by [facility], by registering with the following website: [email or web address]

## 2019-02-01 NOTE — PROGRESS NOTE ADULT - SUBJECTIVE AND OBJECTIVE BOX
Admission Criteria  Please admit the patient to the following service: CARDIOLOGY  Major Criteria:  - Significant volume load > 200 ml    Admit to: 3GUL     Patient is being admitted to the inpatient service due to high risk characteristics and need for further management/monitoring and is considered to be at a significantly increased risk of major adverse cardiac and vascular events if discharged.

## 2019-02-01 NOTE — DISCHARGE NOTE ADULT - MEDICATION SUMMARY - MEDICATIONS TO TAKE
I will START or STAY ON the medications listed below when I get home from the hospital:    Eliquis 5 mg oral tablet  -- 1 tab(s) by mouth once a day  -- Indication: For Atrial flutter    gabapentin 300 mg oral capsule  -- 1 cap(s) by mouth 3 times a day, As Needed  -- Indication: For Anticonvulsant    mirtazapine 15 mg oral tablet  -- 1 tab(s) by mouth once a day (at bedtime)  -- Indication: For Anti depressant    Metoprolol Tartrate 25 mg oral tablet  -- 1 tab(s) by mouth 2 times a day  -- Indication: For beta blocker    chlorthalidone 25 mg oral tablet  -- 1 tab(s) by mouth once a day  -- Indication: For Diuretic    potassium chloride 20 mEq oral tablet, extended release  -- 2 tab(s) by mouth every 4 hours  -- Indication: For Supplement    Fish Oil 1000 mg oral capsule  -- 1 cap(s) by mouth 2 times a day  -- Indication: For SUpplement    levothyroxine 50 mcg (0.05 mg) oral tablet  -- 1 tab(s) by mouth once a day  -- Indication: For Thyroid disease

## 2019-02-01 NOTE — DISCHARGE NOTE ADULT - ADDITIONAL INSTRUCTIONS
Follow up with Dr. Gomes in 2-3 weeks. Our office will contact you in 3-5 days to schedule this appointment. Please call [phone] with questions or concerns.

## 2019-02-01 NOTE — DISCHARGE NOTE ADULT - HOSPITAL COURSE
57 yo gentleman w/pmh of HTN, obstructive sleep apnea, pre-DM (diet controlled), depression, anxiety, hypothyroidism and recently diagnosed atrial flutter s/p SCOTTY/DCCV (11/2018)and ILR implanted (12/19/18). On routine ILR interrogation, he was noted to have recurrent paroxysmal atrial flutter and atrial tachycardia. He presented electively 2/1/19 and underwent uncomplicated radiofrequency ablation of atrial flutter and right atrial (belen) tachycardia. 59 yo gentleman w/pmh of HTN, obstructive sleep apnea, pre-DM (diet controlled), depression, anxiety, hypothyroidism and recently diagnosed atrial flutter s/p SCOTTY/DCCV (11/2018)and ILR implanted (12/19/18). On routine ILR interrogation, he was noted to have recurrent paroxysmal atrial flutter and atrial tachycardia. He presented electively 2/1/19 and underwent uncomplicated radiofrequency ablation of atrial flutter and right atrial (belen) tachycardia.   AM Labs noted.  Hypokalemia Supplemented  REPEAT BLOODWORK NEXT WEEK AS OUTPATIENT  vss  am ekg sr WITH apc'S and PVC's noted HR 78  Follow up with Dr Gomes as outpatient in 7-10 days  OK to d/c home  Continue eliquis as ordered  Will monitor.

## 2019-02-01 NOTE — DISCHARGE NOTE ADULT - CARE PLAN
Principal Discharge DX:	Typical atrial flutter  Goal:	minimize arrhythmia burden  Assessment and plan of treatment:	- Bruising at the groin, sometimes extending down the leg, and/or a small lump under the skin at the groin access site is normal and will resolve within 2 – 3 weeks.   - Occasional skipped beats or palpitations that last for a few beats are common and generally resolve within 1-2 months.   - You may walk and take stairs at a regular pace.   - Do not perform any exercise more strenuous than walking for 1 week.   - Do not strain or lift heavy objects for 1 week.  - You may shower the day after the procedure.  - Do not soak in water (such as tub baths, hot tubs, swimming, etc.) for 1 week.   - You may resume all other activities the day after the procedure.  Call your doctor if:   - you notice bleeding, redness, drainage, swelling, increased tenderness or a hot sensation around the catheter insertion site.   - your temperature is greater than 100 degrees F for more than 24 hours.  - your rapid heart rhythm returns.  - you have any questions or concerns regarding the procedure.  If significant bleeding and/or a large lump (the size of a golf ball or bigger) occurs:  - Lie flat and apply continuous direct pressure just above the puncture site for at least 10 minutes  - If the issue resolves, notify your physician immediately.    - If the bleeding cannot be controlled, please seek immediate medical attention.  If you experience increased difficulty breathing or chest pain, or if you faint or have dizzy spells, please seek immediate medical attention.  Secondary Diagnosis:	Atrial tachycardia

## 2019-02-02 VITALS — HEART RATE: 88 BPM | SYSTOLIC BLOOD PRESSURE: 136 MMHG | DIASTOLIC BLOOD PRESSURE: 81 MMHG | RESPIRATION RATE: 18 BRPM

## 2019-02-02 LAB
ANION GAP SERPL CALC-SCNC: 13 MMOL/L — SIGNIFICANT CHANGE UP (ref 5–17)
BUN SERPL-MCNC: 17 MG/DL — SIGNIFICANT CHANGE UP (ref 8–20)
CALCIUM SERPL-MCNC: 8.4 MG/DL — LOW (ref 8.6–10.2)
CHLORIDE SERPL-SCNC: 95 MMOL/L — LOW (ref 98–107)
CO2 SERPL-SCNC: 29 MMOL/L — SIGNIFICANT CHANGE UP (ref 22–29)
CREAT SERPL-MCNC: 0.67 MG/DL — SIGNIFICANT CHANGE UP (ref 0.5–1.3)
GLUCOSE SERPL-MCNC: 146 MG/DL — HIGH (ref 70–115)
HCT VFR BLD CALC: 42.7 % — SIGNIFICANT CHANGE UP (ref 42–52)
HGB BLD-MCNC: 14.7 G/DL — SIGNIFICANT CHANGE UP (ref 14–18)
MAGNESIUM SERPL-MCNC: 2.1 MG/DL — SIGNIFICANT CHANGE UP (ref 1.6–2.6)
MCHC RBC-ENTMCNC: 34.3 PG — HIGH (ref 27–31)
MCHC RBC-ENTMCNC: 34.4 G/DL — SIGNIFICANT CHANGE UP (ref 32–36)
MCV RBC AUTO: 99.5 FL — HIGH (ref 80–94)
PLATELET # BLD AUTO: 190 K/UL — SIGNIFICANT CHANGE UP (ref 150–400)
POTASSIUM SERPL-MCNC: 3.2 MMOL/L — LOW (ref 3.5–5.3)
POTASSIUM SERPL-SCNC: 3.2 MMOL/L — LOW (ref 3.5–5.3)
RBC # BLD: 4.29 M/UL — LOW (ref 4.6–6.2)
RBC # FLD: 12.7 % — SIGNIFICANT CHANGE UP (ref 11–15.6)
SODIUM SERPL-SCNC: 137 MMOL/L — SIGNIFICANT CHANGE UP (ref 135–145)
WBC # BLD: 8.8 K/UL — SIGNIFICANT CHANGE UP (ref 4.8–10.8)
WBC # FLD AUTO: 8.8 K/UL — SIGNIFICANT CHANGE UP (ref 4.8–10.8)

## 2019-02-02 PROCEDURE — 93010 ELECTROCARDIOGRAM REPORT: CPT

## 2019-02-02 RX ORDER — POTASSIUM CHLORIDE 20 MEQ
2 PACKET (EA) ORAL
Qty: 0 | Refills: 0 | COMMUNITY
Start: 2019-02-02

## 2019-02-02 RX ORDER — POTASSIUM CHLORIDE 20 MEQ
40 PACKET (EA) ORAL EVERY 4 HOURS
Qty: 0 | Refills: 0 | Status: DISCONTINUED | OUTPATIENT
Start: 2019-02-02 | End: 2019-02-02

## 2019-02-02 RX ADMIN — Medication 25 MILLIGRAM(S): at 06:30

## 2019-02-02 RX ADMIN — Medication 50 MICROGRAM(S): at 06:30

## 2019-02-02 RX ADMIN — APIXABAN 5 MILLIGRAM(S): 2.5 TABLET, FILM COATED ORAL at 06:30

## 2019-02-02 RX ADMIN — Medication 40 MILLIEQUIVALENT(S): at 08:52

## 2019-02-05 PROBLEM — F32.9 MAJOR DEPRESSIVE DISORDER, SINGLE EPISODE, UNSPECIFIED: Chronic | Status: ACTIVE | Noted: 2019-01-28

## 2019-02-05 PROBLEM — E03.9 HYPOTHYROIDISM, UNSPECIFIED: Chronic | Status: ACTIVE | Noted: 2019-01-28

## 2019-02-05 PROBLEM — F41.9 ANXIETY DISORDER, UNSPECIFIED: Chronic | Status: ACTIVE | Noted: 2019-01-28

## 2019-02-05 PROBLEM — G47.33 OBSTRUCTIVE SLEEP APNEA (ADULT) (PEDIATRIC): Chronic | Status: ACTIVE | Noted: 2019-01-28

## 2019-02-05 PROBLEM — I10 ESSENTIAL (PRIMARY) HYPERTENSION: Chronic | Status: ACTIVE | Noted: 2019-01-28

## 2019-02-15 ENCOUNTER — APPOINTMENT (OUTPATIENT)
Dept: CARDIOLOGY | Facility: CLINIC | Age: 59
End: 2019-02-15
Payer: MEDICAID

## 2019-02-15 PROCEDURE — 93298 REM INTERROG DEV EVAL SCRMS: CPT

## 2019-02-15 PROCEDURE — 93299: CPT

## 2019-02-15 NOTE — ASSESSMENT
[FreeTextEntry1] : Remote Lnq Summary\par \par 2-15-19 \par Viewed lnq summary 12-19-18 to 1-18-19. \par Events were viewed as pauses, longest 5sec with sleep. \par Toprol was decreased. \par Next summary on PA schedule 32days--cv

## 2019-03-18 ENCOUNTER — APPOINTMENT (OUTPATIENT)
Dept: CARDIOLOGY | Facility: CLINIC | Age: 59
End: 2019-03-18
Payer: MEDICAID

## 2019-03-18 PROCEDURE — 93299: CPT

## 2019-03-18 PROCEDURE — 93298 REM INTERROG DEV EVAL SCRMS: CPT

## 2019-03-18 NOTE — ASSESSMENT
[FreeTextEntry1] : Remote LNq Summary\par \par 3-18-19 \par Viewed lnq summary 1-18-19 to 2-18-19.\par  No events were noted.\par  Next summary on PA schedule 32days--cv

## 2019-03-21 ENCOUNTER — APPOINTMENT (OUTPATIENT)
Dept: ELECTROPHYSIOLOGY | Facility: CLINIC | Age: 59
End: 2019-03-21
Payer: MEDICAID

## 2019-03-21 VITALS
SYSTOLIC BLOOD PRESSURE: 117 MMHG | DIASTOLIC BLOOD PRESSURE: 69 MMHG | OXYGEN SATURATION: 94 % | WEIGHT: 230 LBS | HEIGHT: 71 IN | BODY MASS INDEX: 32.2 KG/M2 | HEART RATE: 74 BPM

## 2019-03-21 PROCEDURE — 99214 OFFICE O/P EST MOD 30 MIN: CPT | Mod: 25

## 2019-03-21 PROCEDURE — 93000 ELECTROCARDIOGRAM COMPLETE: CPT

## 2019-03-21 NOTE — PHYSICAL EXAM
[General Appearance - Well Developed] : well developed [Normal Appearance] : normal appearance [Well Groomed] : well groomed [General Appearance - Well Nourished] : well nourished [No Deformities] : no deformities [General Appearance - In No Acute Distress] : no acute distress [Respiration, Rhythm And Depth] : normal respiratory rhythm and effort [Exaggerated Use Of Accessory Muscles For Inspiration] : no accessory muscle use [Auscultation Breath Sounds / Voice Sounds] : lungs were clear to auscultation bilaterally [Heart Rate And Rhythm] : heart rate and rhythm were normal [Heart Sounds] : normal S1 and S2 [Murmurs] : no murmurs present [Abdomen Soft] : soft [Abdomen Tenderness] : non-tender [Abdomen Mass (___ Cm)] : no abdominal mass palpated [Abnormal Walk] : normal gait [Skin Color & Pigmentation] : normal skin color and pigmentation [] : no rash [No Venous Stasis] : no venous stasis [Skin Lesions] : no skin lesions [No Skin Ulcers] : no skin ulcer [No Xanthoma] : no  xanthoma was observed [Oriented To Time, Place, And Person] : oriented to person, place, and time [Affect] : the affect was normal [Mood] : the mood was normal [No Anxiety] : not feeling anxious

## 2019-03-31 NOTE — REVIEW OF SYSTEMS
[Negative] : Heme/Lymph [Fever] : no fever [Headache] : no headache [Chills] : no chills [Feeling Fatigued] : not feeling fatigued [Blurry Vision] : no blurred vision [Seeing Double (Diplopia)] : no diplopia [Nausea] : no nausea [Vomiting] : no vomiting [Change in Appetite] : no change in appetite [Change In The Stool] : no change in stool [Hematuria] : no hematuria [Dizziness] : no dizziness

## 2019-03-31 NOTE — DISCUSSION/SUMMARY
[FreeTextEntry1] : 57 yo gentleman w/pmh of HTN, obstructive sleep apnea, pre-DM (diet controlled), depression, anxiety, hypothyroidism and recently diagnosed atrial flutter s/p SCOTTY/DCCV (11/2018)and ILR implanted (12/19/18). On routine ILR interrogation, he was noted to have recurrent paroxysmal atrial flutter. Now status post uncomplicated radiofrequency ablation of atrial flutter and right atrial (belen) tach. On ILR interrogation pause episode notable from 1/8/19 at 05:44 c/w 3 second sinus pause.  As per patient this was during HOS and BB has been reduced to 25mg BID with no recurrent pause episodes. He feels significant improvement since ablation and denies chest pain, SOB, FLORES, palpitations, dizziness, lightheadedness, syncope or near syncope. \par -groin access site clean, dry and well healed. no bleeding or hematoma noted.\par -NO recurrent arrhythmia on ILR interrogation. Continue Metoprolol 25mg BID. \par -CHADsVASc 1 (HTN) Continue Eliquis (pt denies bleeding complications.  Will establish care with Dr. Spencer in 3 months at [facility].  If no recurrent arrhythmia on ILR may consider D/C of anticoagulation at that time with close ILR monitoring \par \par Shima Barkley ANP-C \par

## 2019-03-31 NOTE — HISTORY OF PRESENT ILLNESS
[FreeTextEntry1] : 57 yo gentleman w/pmh of HTN, obstructive sleep apnea, pre-DM (diet controlled), depression, anxiety, hypothyroidism and recently diagnosed atrial flutter s/p SCOTTY/DCCV (11/2018)and ILR implanted (12/19/18). On routine ILR interrogation, he was noted to have recurrent paroxysmal atrial flutter. Now status post uncomplicated radiofrequency ablation of atrial flutter and right atrial (belen) tach. \par ILR has had no recurrent atrial arrhythmias. On ILR interrogation pause episode notable from 1/8/19 at 05:44 c/w 3 second sinus pause.  As per patient this was during HOS and BB has been reduced to 25mg BID with no recurrent pause episodes. He feels significant improvement since ablation and denies chest pain, SOB, FLORES, palpitations, dizziness, lightheadedness, syncope or near syncope.

## 2019-04-08 ENCOUNTER — RX RENEWAL (OUTPATIENT)
Age: 59
End: 2019-04-08

## 2019-04-24 ENCOUNTER — APPOINTMENT (OUTPATIENT)
Dept: CARDIOLOGY | Facility: CLINIC | Age: 59
End: 2019-04-24
Payer: MEDICAID

## 2019-04-24 PROCEDURE — 93298 REM INTERROG DEV EVAL SCRMS: CPT

## 2019-04-24 PROCEDURE — 93299: CPT

## 2019-04-24 NOTE — ASSESSMENT
[FreeTextEntry1] : Remote LNq Summary\par \par 4-24-19 \par Viewed lnq summary 2-18-19 to 3-22-19. \par No events were noted. \par Next summary on PA schedule 32days--cv

## 2019-05-24 ENCOUNTER — APPOINTMENT (OUTPATIENT)
Dept: CARDIOLOGY | Facility: CLINIC | Age: 59
End: 2019-05-24
Payer: MEDICAID

## 2019-05-24 PROCEDURE — 93298 REM INTERROG DEV EVAL SCRMS: CPT

## 2019-05-24 PROCEDURE — 93299: CPT

## 2019-05-24 NOTE — ASSESSMENT
[FreeTextEntry1] : Remote Lnq Summary\par \par 5-24-19 \par Viewed lnq summary 3-22-19 to 4-22-19. \par No events were noted. \par Next summary on PA schedule 32days--cv

## 2019-06-03 RX ORDER — METOPROLOL TARTRATE 25 MG/1
25 TABLET, FILM COATED ORAL
Qty: 60 | Refills: 5 | Status: DISCONTINUED | COMMUNITY
Start: 2019-04-08 | End: 2019-06-03

## 2019-06-25 ENCOUNTER — NON-APPOINTMENT (OUTPATIENT)
Age: 59
End: 2019-06-25

## 2019-06-25 ENCOUNTER — APPOINTMENT (OUTPATIENT)
Dept: ELECTROPHYSIOLOGY | Facility: CLINIC | Age: 59
End: 2019-06-25
Payer: MEDICAID

## 2019-06-25 VITALS
HEIGHT: 71 IN | SYSTOLIC BLOOD PRESSURE: 120 MMHG | WEIGHT: 232 LBS | DIASTOLIC BLOOD PRESSURE: 72 MMHG | OXYGEN SATURATION: 98 % | BODY MASS INDEX: 32.48 KG/M2 | HEART RATE: 71 BPM

## 2019-06-25 PROCEDURE — 99214 OFFICE O/P EST MOD 30 MIN: CPT

## 2019-06-25 PROCEDURE — 93000 ELECTROCARDIOGRAM COMPLETE: CPT

## 2019-06-25 RX ORDER — KETOCONAZOLE 20 MG/G
2 CREAM TOPICAL
Refills: 0 | Status: DISCONTINUED | COMMUNITY
End: 2019-06-25

## 2019-06-26 ENCOUNTER — APPOINTMENT (OUTPATIENT)
Dept: CARDIOLOGY | Facility: CLINIC | Age: 59
End: 2019-06-26
Payer: MEDICAID

## 2019-06-26 PROCEDURE — 93299: CPT

## 2019-06-26 PROCEDURE — 93298 REM INTERROG DEV EVAL SCRMS: CPT

## 2019-06-26 NOTE — ASSESSMENT
[FreeTextEntry1] : \par 6-26-19 [facility] LINQ summary:\par DOI: 12-19-18\par Dx: AF management\par \par \par 4-22-19 to 5-23-19:\par \par No new events are noted. \par \par Next summary 32 days.\par \par Sincerely,\par \par Gale Oakes MSPAC\par

## 2019-07-01 ENCOUNTER — RX RENEWAL (OUTPATIENT)
Age: 59
End: 2019-07-01

## 2019-07-01 ENCOUNTER — RX CHANGE (OUTPATIENT)
Age: 59
End: 2019-07-01

## 2019-07-05 NOTE — PHYSICAL EXAM
[General Appearance - Well Developed] : well developed [General Appearance - In No Acute Distress] : no acute distress [Respiration, Rhythm And Depth] : normal respiratory rhythm and effort [Auscultation Breath Sounds / Voice Sounds] : lungs were clear to auscultation bilaterally [Heart Rate And Rhythm] : heart rate and rhythm were normal [Heart Sounds] : normal S1 and S2 [Murmurs] : no murmurs present [Abdomen Soft] : soft [Abdomen Tenderness] : non-tender [Abnormal Walk] : normal gait [] : no rash [No Venous Stasis] : no venous stasis [No Anxiety] : not feeling anxious [Affect] : the affect was normal [Mood] : the mood was normal [Normal Conjunctiva] : the conjunctiva exhibited no abnormalities [Normal Jugular Venous V Waves Present] : normal jugular venous V waves present [No Oral Pallor] : no oral pallor [Arterial Pulses Normal] : the arterial pulses were normal [Edema] : no peripheral edema present [Nail Clubbing] : no clubbing of the fingernails [Cyanosis, Localized] : no localized cyanosis [Impaired Insight] : insight and judgment were intact

## 2019-07-05 NOTE — REVIEW OF SYSTEMS
[Negative] : Heme/Lymph [Fever] : no fever [Chills] : no chills [Headache] : no headache [Feeling Fatigued] : not feeling fatigued [Blurry Vision] : no blurred vision [Seeing Double (Diplopia)] : no diplopia [Nausea] : no nausea [Vomiting] : no vomiting [Change In The Stool] : no change in stool [Change in Appetite] : no change in appetite [Hematuria] : no hematuria [Dizziness] : no dizziness

## 2019-07-05 NOTE — HISTORY OF PRESENT ILLNESS
[FreeTextEntry1] : 57 yo man s/p atrial flutter ablation and right atrial (belen) tach. ( noted on ILR). He has h/o HTN, obstructive sleep apnea, pre-DM (diet controlled), depression, anxiety, hypothyroidism. He had prior atrial flutter and underwent  SCOTTY/DCCV 11/2018 and ILR implanted 12/19/18.\par On ILR interrogation - no recurrent atrial arrhythmias.\par He denies chest pain, SOB, FLORES, palpitations, dizziness, lightheadedness, syncope or near syncope.

## 2019-07-05 NOTE — DISCUSSION/SUMMARY
[FreeTextEntry1] :  ILR interrogation - no atrial arrhythmia or pauses. Continue Metoprolol 25mg BID. \par CHADsVASc 1 (HTN). Will consider d/c eliquis if no AF over the next 3 months\par HTN - controlled.\par Obstructive sleep apnea - followup eval. \par Remote monitoring

## 2019-07-07 ENCOUNTER — RX RENEWAL (OUTPATIENT)
Age: 59
End: 2019-07-07

## 2019-07-29 ENCOUNTER — APPOINTMENT (OUTPATIENT)
Dept: CARDIOLOGY | Facility: CLINIC | Age: 59
End: 2019-07-29
Payer: MEDICAID

## 2019-07-29 PROCEDURE — 93298 REM INTERROG DEV EVAL SCRMS: CPT

## 2019-07-29 PROCEDURE — 93299: CPT

## 2019-07-29 NOTE — ASSESSMENT
[FreeTextEntry1] : Remote Lnq summary\par \par 7-29-19\par Viewed lnq summary 5-23-19 to 6-23-19.\par  No events were noted. \par Next summary on PA schedule 32days--cv

## 2019-09-06 ENCOUNTER — APPOINTMENT (OUTPATIENT)
Dept: CARDIOLOGY | Facility: CLINIC | Age: 59
End: 2019-09-06
Payer: MEDICAID

## 2019-09-06 PROCEDURE — 93298 REM INTERROG DEV EVAL SCRMS: CPT

## 2019-09-06 PROCEDURE — 93299: CPT

## 2019-09-06 NOTE — ASSESSMENT
[FreeTextEntry1] : Remote Lnq Summary\par \par 9-6-19 \par Viewed lnq summary 6-23-19 to 7-24-19. \par No events were noted. \par Next summary on pa schedule 32days--cv

## 2019-09-15 NOTE — ASU PATIENT PROFILE, ADULT - NS TRANSFER PATIENT BELONGINGS
-Please follow up with Dr. Crabtree in 1-2 weeks.  The office is located at [address] [place] [address], [address] Floor, [place] [address].  Call us with any questions [phone].    -Call your doctor if you have shortness of breath, chest pain not relieved by pain medication, dizziness, fever >101.5, or increased redness/drainage from your incision. Clothing

## 2019-10-15 ENCOUNTER — MEDICATION RENEWAL (OUTPATIENT)
Age: 59
End: 2019-10-15

## 2019-10-15 ENCOUNTER — OTHER (OUTPATIENT)
Age: 59
End: 2019-10-15

## 2019-10-16 ENCOUNTER — APPOINTMENT (OUTPATIENT)
Dept: CARDIOLOGY | Facility: CLINIC | Age: 59
End: 2019-10-16
Payer: MEDICAID

## 2019-10-16 PROCEDURE — 93298 REM INTERROG DEV EVAL SCRMS: CPT

## 2019-10-16 PROCEDURE — 93299: CPT

## 2019-10-16 NOTE — ASSESSMENT
[FreeTextEntry1] : Remote LNq Summary\par \par 10-16-19 \par Viewed lnq summary 7-24-19 to 8-24-19.\par No events were noted.\par  Next summary on pa schedule 32days-cv

## 2019-10-24 ENCOUNTER — APPOINTMENT (OUTPATIENT)
Dept: CARDIOLOGY | Facility: CLINIC | Age: 59
End: 2019-10-24
Payer: MEDICAID

## 2019-10-24 VITALS
DIASTOLIC BLOOD PRESSURE: 60 MMHG | HEIGHT: 71 IN | HEART RATE: 76 BPM | SYSTOLIC BLOOD PRESSURE: 110 MMHG | BODY MASS INDEX: 32.2 KG/M2 | WEIGHT: 230 LBS | OXYGEN SATURATION: 98 %

## 2019-10-24 PROCEDURE — 99215 OFFICE O/P EST HI 40 MIN: CPT

## 2019-10-24 RX ORDER — APIXABAN 5 MG/1
5 TABLET, FILM COATED ORAL
Qty: 180 | Refills: 1 | Status: DISCONTINUED | COMMUNITY
Start: 2019-07-07 | End: 2019-10-24

## 2019-10-24 RX ORDER — APIXABAN 5 MG/1
5 TABLET, FILM COATED ORAL
Qty: 60 | Refills: 3 | Status: DISCONTINUED | COMMUNITY
Start: 2018-12-20 | End: 2019-10-24

## 2019-10-24 RX ORDER — METFORMIN HYDROCHLORIDE 500 MG/1
500 TABLET, COATED ORAL DAILY
Refills: 0 | Status: ACTIVE | COMMUNITY

## 2019-10-24 NOTE — HISTORY OF PRESENT ILLNESS
[FreeTextEntry1] : 58 year old male with history of HLD, HTN, ALISON on CPA, atrial flutter s/p atrial flutter ablation and right atrial (belen) tach. (noted on ILR). He had prior atrial flutter and underwent  SCOTTY/DCCV 11/2018 and ILR implanted 12/19/18.\par On ILR interrogation - no recurrent atrial arrhythmias.\par He denies chest pain, SOB, FLORES, palpitations, dizziness, lightheadedness, syncope or near syncope. \par Compliant with medications and reports no adverse effects.

## 2019-10-24 NOTE — REVIEW OF SYSTEMS
[Negative] : Heme/Lymph [Fever] : no fever [Chills] : no chills [Headache] : no headache [Feeling Fatigued] : not feeling fatigued [Blurry Vision] : no blurred vision [Seeing Double (Diplopia)] : no diplopia [Nausea] : no nausea [Vomiting] : no vomiting [Change in Appetite] : no change in appetite [Change In The Stool] : no change in stool [Hematuria] : no hematuria [Dizziness] : no dizziness

## 2019-10-24 NOTE — DISCUSSION/SUMMARY
[FreeTextEntry1] : 1. Atrial Flutter: s/p ablation with no recurrence. Has ILR which is being monitored. Continue Metoprolol (high risk medication with no signs of toxicity). Has been off oral AC. On Aspirin 81mg daily.\par \par 2. Hypertension: appears controlled. Continue current medical therapy.\par \par 3. ALISON: encouraged continued CPAP compliance.\par \par 4. HLD: diet controlled.

## 2019-10-24 NOTE — PHYSICAL EXAM
[General Appearance - Well Developed] : well developed [General Appearance - In No Acute Distress] : no acute distress [Normal Conjunctiva] : the conjunctiva exhibited no abnormalities [No Oral Pallor] : no oral pallor [Respiration, Rhythm And Depth] : normal respiratory rhythm and effort [Auscultation Breath Sounds / Voice Sounds] : lungs were clear to auscultation bilaterally [Heart Sounds] : normal S1 and S2 [Murmurs] : no murmurs present [Heart Rate And Rhythm] : heart rate and rhythm were normal [Edema] : no peripheral edema present [Abnormal Walk] : normal gait [Nail Clubbing] : no clubbing of the fingernails [Cyanosis, Localized] : no localized cyanosis [No Venous Stasis] : no venous stasis [Affect] : the affect was normal [Impaired Insight] : insight and judgment were intact [No Anxiety] : not feeling anxious [Normal Appearance] : normal appearance [General Appearance - Well Nourished] : well nourished [Normal Oral Mucosa] : normal oral mucosa [Eyelids - No Xanthelasma] : the eyelids demonstrated no xanthelasmas [No Oral Cyanosis] : no oral cyanosis [FreeTextEntry1] : No JVD, no carotid artery bruits auscultated bilaterally [Exaggerated Use Of Accessory Muscles For Inspiration] : no accessory muscle use [] : no ischemic changes [Gait - Sufficient For Exercise Testing] : the gait was sufficient for exercise testing

## 2019-10-31 PROCEDURE — C1731: CPT

## 2019-10-31 PROCEDURE — 80048 BASIC METABOLIC PNL TOTAL CA: CPT

## 2019-10-31 PROCEDURE — 36415 COLL VENOUS BLD VENIPUNCTURE: CPT

## 2019-10-31 PROCEDURE — 83735 ASSAY OF MAGNESIUM: CPT

## 2019-10-31 PROCEDURE — C1732: CPT

## 2019-10-31 PROCEDURE — 93655 ICAR CATH ABLTJ DSCRT ARRHYT: CPT

## 2019-10-31 PROCEDURE — 93005 ELECTROCARDIOGRAM TRACING: CPT

## 2019-10-31 PROCEDURE — 85027 COMPLETE CBC AUTOMATED: CPT

## 2019-10-31 PROCEDURE — 93621 COMP EP EVL L PAC&REC C SINS: CPT

## 2019-10-31 PROCEDURE — 93653 COMPRE EP EVAL TX SVT: CPT

## 2019-10-31 PROCEDURE — C1894: CPT

## 2019-10-31 PROCEDURE — 93613 INTRACARDIAC EPHYS 3D MAPG: CPT

## 2019-12-03 ENCOUNTER — APPOINTMENT (OUTPATIENT)
Dept: CARDIOLOGY | Facility: CLINIC | Age: 59
End: 2019-12-03
Payer: MEDICAID

## 2019-12-03 PROCEDURE — 93299: CPT

## 2019-12-03 PROCEDURE — 93298 REM INTERROG DEV EVAL SCRMS: CPT

## 2019-12-03 NOTE — ASSESSMENT
[FreeTextEntry1] : Remote LNq Summary\par \par 12-3-19 \par Viewed lnq summary 8-24-19 to 9-24-19. \par No events were noted \par Next summary on pa schedule 32days-cv

## 2019-12-16 ENCOUNTER — RX CHANGE (OUTPATIENT)
Age: 59
End: 2019-12-16

## 2020-01-07 ENCOUNTER — APPOINTMENT (OUTPATIENT)
Dept: CARDIOLOGY | Facility: CLINIC | Age: 60
End: 2020-01-07
Payer: MEDICAID

## 2020-01-07 PROCEDURE — 93298 REM INTERROG DEV EVAL SCRMS: CPT

## 2020-01-07 PROCEDURE — G2066: CPT

## 2020-01-07 NOTE — ASSESSMENT
[FreeTextEntry1] : Remote LNq summary\par \par 1-7-20 \par Viewed lnq summary 9-24-19 to 10-25-19.\par  No events were noted.\par  Next summary on pa schedule 32days-cv

## 2020-01-15 ENCOUNTER — APPOINTMENT (OUTPATIENT)
Dept: ELECTROPHYSIOLOGY | Facility: CLINIC | Age: 60
End: 2020-01-15
Payer: MEDICAID

## 2020-01-15 VITALS
OXYGEN SATURATION: 96 % | BODY MASS INDEX: 32.36 KG/M2 | HEART RATE: 62 BPM | DIASTOLIC BLOOD PRESSURE: 60 MMHG | WEIGHT: 232 LBS | SYSTOLIC BLOOD PRESSURE: 100 MMHG

## 2020-01-15 PROCEDURE — 99213 OFFICE O/P EST LOW 20 MIN: CPT

## 2020-01-15 NOTE — PHYSICAL EXAM
[General Appearance - In No Acute Distress] : no acute distress [General Appearance - Well Developed] : well developed [Normal Conjunctiva] : the conjunctiva exhibited no abnormalities [No Oral Pallor] : no oral pallor [Normal Jugular Venous V Waves Present] : normal jugular venous V waves present [Auscultation Breath Sounds / Voice Sounds] : lungs were clear to auscultation bilaterally [Respiration, Rhythm And Depth] : normal respiratory rhythm and effort [Heart Rate And Rhythm] : heart rate and rhythm were normal [Murmurs] : no murmurs present [Heart Sounds] : normal S1 and S2 [Abdomen Tenderness] : non-tender [Edema] : no peripheral edema present [Abdomen Soft] : soft [Arterial Pulses Normal] : the arterial pulses were normal [Nail Clubbing] : no clubbing of the fingernails [Abnormal Walk] : normal gait [] : no rash [Cyanosis, Localized] : no localized cyanosis [Impaired Insight] : insight and judgment were intact [No Anxiety] : not feeling anxious [Mood] : the mood was normal [No Oral Cyanosis] : no oral cyanosis

## 2020-01-20 ENCOUNTER — RX CHANGE (OUTPATIENT)
Age: 60
End: 2020-01-20

## 2020-01-31 NOTE — REVIEW OF SYSTEMS
[Negative] : Integumentary [Fever] : no fever [Headache] : no headache [Chills] : no chills [Feeling Fatigued] : not feeling fatigued [Blurry Vision] : no blurred vision [Seeing Double (Diplopia)] : no diplopia [Nausea] : no nausea [Vomiting] : no vomiting [Change in Appetite] : no change in appetite [Change In The Stool] : no change in stool [Hematuria] : no hematuria [Dizziness] : no dizziness

## 2020-01-31 NOTE — HISTORY OF PRESENT ILLNESS
[FreeTextEntry1] : The patient is a 59 year old man who has come in today for a follow up for his remote monitor evaluation. Patient state he feels great, and has had no symptoms. He denies chest pain, SOB, FLORES, palpitations, dizziness, lightheadedness, syncope or near syncope. \par \par \par s/p atrial flutter ablation and right atrial (belen) tach. ( noted on ILR). He has h/o HTN, obstructive sleep apnea, pre-DM (diet controlled), depression, anxiety, hypothyroidism. He had prior atrial flutter and underwent  SCOTTY/DCCV 11/2018 and ILR implanted 12/19/18.\par \par

## 2020-01-31 NOTE — ADDENDUM
[FreeTextEntry1] : I, Mandie Marti, acted solely as a scribe for Dr. Spencer on this date 01/15/2020.

## 2020-01-31 NOTE — DISCUSSION/SUMMARY
[FreeTextEntry1] : The implantable loop monitor was interrogated and there were no episodes of atrial arrhythmia, bradycardia or pauses.  He is currently on metoprolol which would recommend continued.  Also would continue him on aspirin.  His blood pressure is controlled.  He has prior history of sleep apnea and recommend a follow-up evaluation.\par \par Continue with remote monitoring \par \par The patient has agreed to this plan of management and has expressed full understanding.  All questions were fully answered to the patient's satisfaction.\par \par Over 50% of the time spent with the patient was on counseling the patient on the above diagnosis, treatment plan and prognosis.

## 2020-01-31 NOTE — END OF VISIT
[FreeTextEntry3] : All medical records entries made by the Scribe were at my, Dr. German Spencer MD, direction and personally dictated by me on 01/15/2020. I have personally reviewed the chart and agree that the record accurately reflects my personal performance of the history, physical exam, assessment, and plan. I have also personally directed, reviewed, and agreed with the chart.\par \par

## 2020-02-18 ENCOUNTER — APPOINTMENT (OUTPATIENT)
Dept: CARDIOLOGY | Facility: CLINIC | Age: 60
End: 2020-02-18
Payer: MEDICAID

## 2020-02-18 PROCEDURE — 93298 REM INTERROG DEV EVAL SCRMS: CPT

## 2020-02-18 PROCEDURE — G2066: CPT

## 2020-02-18 NOTE — ASSESSMENT
[FreeTextEntry1] : Remote LNq Summary\par \par 2-18-20 \par Viewed lnq summary 10-25-19 to 11-25-19. \par No events were noted. \par Next summary on pa schedule 32d-cv

## 2020-03-24 ENCOUNTER — APPOINTMENT (OUTPATIENT)
Dept: CARDIOLOGY | Facility: CLINIC | Age: 60
End: 2020-03-24
Payer: MEDICAID

## 2020-03-24 PROCEDURE — 93298 REM INTERROG DEV EVAL SCRMS: CPT

## 2020-03-24 PROCEDURE — G2066: CPT

## 2020-03-24 NOTE — ASSESSMENT
[FreeTextEntry1] : Remote LNQ Summary\par \par 3-24-20 \par Viewed lnq summary 11-25-19 to 12-26-19.\par No events were noted. \par Next summary on pa schedule 32days-cv

## 2020-04-29 ENCOUNTER — APPOINTMENT (OUTPATIENT)
Dept: CARDIOLOGY | Facility: CLINIC | Age: 60
End: 2020-04-29

## 2020-04-30 ENCOUNTER — APPOINTMENT (OUTPATIENT)
Dept: CARDIOLOGY | Facility: CLINIC | Age: 60
End: 2020-04-30
Payer: MEDICAID

## 2020-04-30 PROCEDURE — G2066: CPT

## 2020-04-30 PROCEDURE — 93298 REM INTERROG DEV EVAL SCRMS: CPT

## 2020-04-30 NOTE — ASSESSMENT
[FreeTextEntry1] : Remote Lnq summary\par \par 4-30/20 \par Viewed lnq summary 12-26-19 top 1-24-20 and 1-24-20 to 2-26-20 (to catch up on automatic cycle, approved by Valorie). \par No events were noted. \par Next summary on pa schedule 32days-cv

## 2020-06-04 ENCOUNTER — APPOINTMENT (OUTPATIENT)
Dept: CARDIOLOGY | Facility: CLINIC | Age: 60
End: 2020-06-04
Payer: MEDICAID

## 2020-06-04 PROCEDURE — 93298 REM INTERROG DEV EVAL SCRMS: CPT

## 2020-06-04 PROCEDURE — G2066: CPT

## 2020-06-04 NOTE — ASSESSMENT
[FreeTextEntry1] : Remote LNq Summary\par \par 6-4-20 \par Viewed lnq summary 2-26-20 to 3-28-20, 3-28-20 to 4-28-20 and 4-29-20 to 6-4-20 (approved by Kristine). \par No events were noted. \par Next summary on pa schedule 32days-cv.

## 2020-07-16 ENCOUNTER — APPOINTMENT (OUTPATIENT)
Dept: CARDIOLOGY | Facility: CLINIC | Age: 60
End: 2020-07-16
Payer: MEDICAID

## 2020-07-16 PROCEDURE — 93298 REM INTERROG DEV EVAL SCRMS: CPT

## 2020-07-16 PROCEDURE — G2066: CPT

## 2020-07-20 NOTE — ASSESSMENT
[FreeTextEntry1] : Remote Lnq Summary\par \par 7-16-20 \par \par Viewed lnq summary 6-5-20 to 7-16-20. \par No events were noted. \par Next summary on pa schedule 32days-cv\par \par pt due to f/u with VB (cancelled in april )taske fD

## 2020-08-07 ENCOUNTER — APPOINTMENT (OUTPATIENT)
Dept: CARDIOLOGY | Facility: CLINIC | Age: 60
End: 2020-08-07
Payer: MEDICAID

## 2020-08-07 ENCOUNTER — NON-APPOINTMENT (OUTPATIENT)
Age: 60
End: 2020-08-07

## 2020-08-07 VITALS
WEIGHT: 220 LBS | OXYGEN SATURATION: 96 % | DIASTOLIC BLOOD PRESSURE: 80 MMHG | HEART RATE: 77 BPM | TEMPERATURE: 98.8 F | BODY MASS INDEX: 30.8 KG/M2 | SYSTOLIC BLOOD PRESSURE: 122 MMHG | HEIGHT: 71 IN

## 2020-08-07 PROCEDURE — 93000 ELECTROCARDIOGRAM COMPLETE: CPT

## 2020-08-07 PROCEDURE — 99215 OFFICE O/P EST HI 40 MIN: CPT | Mod: 25

## 2020-08-07 RX ORDER — LOPERAMIDE HYDROCHLORIDE 2 MG/1
2 CAPSULE ORAL 4 TIMES DAILY
Refills: 0 | Status: DISCONTINUED | COMMUNITY
End: 2020-08-07

## 2020-08-07 NOTE — DISCUSSION/SUMMARY
[FreeTextEntry1] : 1. Atrial Flutter: s/p ablation with no recurrence. Has ILR which is being monitored. Continue Metoprolol (high risk medication with no signs of toxicity). Has been off oral AC. On Aspirin 81mg daily.\par \par 2. Hypertension: appears controlled. Continue current medical therapy.\par \par 3. ALISON: encouraged continued CPAP compliance.\par \par 4. HLD: diet controlled. \par \par Follow up in 6 months.

## 2020-08-07 NOTE — HISTORY OF PRESENT ILLNESS
[FreeTextEntry1] : Historical Perspective:\par 59 year old male with history of HLD, HTN, ALISON on CPA, atrial flutter s/p atrial flutter ablation and right atrial (belen) tach. (noted on ILR). He had prior atrial flutter and underwent  SCOTTY/DCCV 11/2018 and ILR implanted 12/19/18.\par \par Current Health Status\par On ILR interrogation - no recurrent atrial arrhythmias.\par He denies chest pain, SOB, FLORES, palpitations, dizziness, lightheadedness, syncope or near syncope. \par Compliant with medications and reports no adverse effects.

## 2020-08-07 NOTE — PHYSICAL EXAM
[General Appearance - Well Developed] : well developed [Normal Appearance] : normal appearance [General Appearance - Well Nourished] : well nourished [General Appearance - In No Acute Distress] : no acute distress [Eyelids - No Xanthelasma] : the eyelids demonstrated no xanthelasmas [Normal Conjunctiva] : the conjunctiva exhibited no abnormalities [Normal Oral Mucosa] : normal oral mucosa [No Oral Cyanosis] : no oral cyanosis [No Oral Pallor] : no oral pallor [Respiration, Rhythm And Depth] : normal respiratory rhythm and effort [Exaggerated Use Of Accessory Muscles For Inspiration] : no accessory muscle use [Heart Rate And Rhythm] : heart rate and rhythm were normal [Heart Sounds] : normal S1 and S2 [Auscultation Breath Sounds / Voice Sounds] : lungs were clear to auscultation bilaterally [Murmurs] : no murmurs present [Edema] : no peripheral edema present [Gait - Sufficient For Exercise Testing] : the gait was sufficient for exercise testing [Abnormal Walk] : normal gait [Cyanosis, Localized] : no localized cyanosis [Nail Clubbing] : no clubbing of the fingernails [] : no rash [No Venous Stasis] : no venous stasis [Impaired Insight] : insight and judgment were intact [Affect] : the affect was normal [No Anxiety] : not feeling anxious [FreeTextEntry1] : No JVD, no carotid artery bruits auscultated bilaterally

## 2020-08-07 NOTE — REVIEW OF SYSTEMS
[Negative] : Endocrine [Fever] : no fever [Headache] : no headache [Chills] : no chills [Blurry Vision] : no blurred vision [Feeling Fatigued] : not feeling fatigued [Seeing Double (Diplopia)] : no diplopia [Vomiting] : no vomiting [Nausea] : no nausea [Change in Appetite] : no change in appetite [Change In The Stool] : no change in stool [Hematuria] : no hematuria [Dizziness] : no dizziness

## 2020-08-20 ENCOUNTER — APPOINTMENT (OUTPATIENT)
Dept: CARDIOLOGY | Facility: CLINIC | Age: 60
End: 2020-08-20
Payer: MEDICAID

## 2020-08-20 PROCEDURE — G2066: CPT

## 2020-08-20 PROCEDURE — 93298 REM INTERROG DEV EVAL SCRMS: CPT

## 2020-08-20 NOTE — ASSESSMENT
[FreeTextEntry1] : Remote LNQ Summary\par \par \par 8-20-20 \par Viewed lnq summary 7-17-20 to 8-20-20. \par No events were noted. \par Next summary on pa schedule 32d-cv

## 2020-09-22 ENCOUNTER — APPOINTMENT (OUTPATIENT)
Dept: CARDIOLOGY | Facility: CLINIC | Age: 60
End: 2020-09-22
Payer: MEDICAID

## 2020-09-22 PROCEDURE — G2066: CPT

## 2020-09-22 PROCEDURE — 93298 REM INTERROG DEV EVAL SCRMS: CPT

## 2020-09-22 NOTE — ASSESSMENT
[FreeTextEntry1] : Remote Lnq Summary\par \par \par 9-22-20 \par Viewed lnq summary 8-21-20  to 9-22-20. \par No events were noted.\par Pt saw RJ on 1-15-20\par  Next summary on pa schedule 32d-cv

## 2020-10-30 ENCOUNTER — APPOINTMENT (OUTPATIENT)
Dept: CARDIOLOGY | Facility: CLINIC | Age: 60
End: 2020-10-30
Payer: MEDICAID

## 2020-10-30 PROCEDURE — 93298 REM INTERROG DEV EVAL SCRMS: CPT

## 2020-10-30 PROCEDURE — G2066: CPT

## 2020-10-30 NOTE — ASSESSMENT
[FreeTextEntry1] : Remote lnq summary \par \par 10-30-20 \par Viewed lnq summary 9-23-20 to 10-30-20.\par No events were noted.\par Seen by RJ 1-15-20\par  Next summary on pa schedule 32d-cv

## 2020-12-04 ENCOUNTER — APPOINTMENT (OUTPATIENT)
Dept: CARDIOLOGY | Facility: CLINIC | Age: 60
End: 2020-12-04
Payer: MEDICAID

## 2020-12-04 PROCEDURE — 93298 REM INTERROG DEV EVAL SCRMS: CPT

## 2020-12-04 PROCEDURE — G2066: CPT

## 2020-12-04 NOTE — ASSESSMENT
[FreeTextEntry1] : Remote LNq Summary\par \par 12-4-20 \par Viewed lnq summary 10-29-20 to 12-4-20. \par No events were noted. \par Next summary on pa schedule 32d-cv

## 2021-01-08 ENCOUNTER — APPOINTMENT (OUTPATIENT)
Dept: CARDIOLOGY | Facility: CLINIC | Age: 61
End: 2021-01-08
Payer: MEDICAID

## 2021-01-08 DIAGNOSIS — G47.30 SLEEP APNEA, UNSPECIFIED: ICD-10-CM

## 2021-01-08 PROCEDURE — G2066: CPT

## 2021-01-08 PROCEDURE — 93298 REM INTERROG DEV EVAL SCRMS: CPT

## 2021-01-08 NOTE — ASSESSMENT
[FreeTextEntry1] : Remote LNq Summary 1/8/21\par \par Viewed lnq summary 12/4/20 to 1/8/21\par One event viewed as 3.5s pause while sleeping, hx of same (ALISON), no changes were advised. \par \par Next summary 32d.

## 2021-01-11 ENCOUNTER — RX RENEWAL (OUTPATIENT)
Age: 61
End: 2021-01-11

## 2021-02-12 ENCOUNTER — NON-APPOINTMENT (OUTPATIENT)
Age: 61
End: 2021-02-12

## 2021-02-12 ENCOUNTER — APPOINTMENT (OUTPATIENT)
Dept: CARDIOLOGY | Facility: CLINIC | Age: 61
End: 2021-02-12
Payer: MEDICAID

## 2021-02-12 VITALS
TEMPERATURE: 97.7 F | HEART RATE: 80 BPM | BODY MASS INDEX: 32.2 KG/M2 | HEIGHT: 71 IN | SYSTOLIC BLOOD PRESSURE: 152 MMHG | OXYGEN SATURATION: 98 % | WEIGHT: 230 LBS | DIASTOLIC BLOOD PRESSURE: 90 MMHG

## 2021-02-12 PROCEDURE — 99072 ADDL SUPL MATRL&STAF TM PHE: CPT

## 2021-02-12 PROCEDURE — 93000 ELECTROCARDIOGRAM COMPLETE: CPT

## 2021-02-12 PROCEDURE — 99215 OFFICE O/P EST HI 40 MIN: CPT | Mod: 25
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
## 2021-02-12 NOTE — DISCUSSION/SUMMARY
[FreeTextEntry1] : 1. Atrial Flutter: s/p ablation with no recurrence. Has ILR which is being monitored. Continue Metoprolol (high risk medication with no signs of toxicity). Has been off oral AC. On Aspirin 81mg daily.\par \par 2. Hypertension: elevated but patient undergoing stressful situation. Historically controlled in the past. Continue current medical therapy. \par 3. ALISON: encouraged continued CPAP compliance.\par \par 4. HLD: diet controlled. \par \par Follow up in 6 months.

## 2021-02-12 NOTE — PHYSICAL EXAM
[General Appearance - Well Developed] : well developed [Normal Appearance] : normal appearance [General Appearance - Well Nourished] : well nourished [General Appearance - In No Acute Distress] : no acute distress [Normal Conjunctiva] : the conjunctiva exhibited no abnormalities [Eyelids - No Xanthelasma] : the eyelids demonstrated no xanthelasmas [Normal Oral Mucosa] : normal oral mucosa [No Oral Pallor] : no oral pallor [No Oral Cyanosis] : no oral cyanosis [Respiration, Rhythm And Depth] : normal respiratory rhythm and effort [Exaggerated Use Of Accessory Muscles For Inspiration] : no accessory muscle use [Auscultation Breath Sounds / Voice Sounds] : lungs were clear to auscultation bilaterally [Heart Rate And Rhythm] : heart rate and rhythm were normal [Heart Sounds] : normal S1 and S2 [Murmurs] : no murmurs present [Edema] : no peripheral edema present [Abnormal Walk] : normal gait [Gait - Sufficient For Exercise Testing] : the gait was sufficient for exercise testing [Nail Clubbing] : no clubbing of the fingernails [Cyanosis, Localized] : no localized cyanosis [] : no rash [No Venous Stasis] : no venous stasis [Impaired Insight] : insight and judgment were intact [Affect] : the affect was normal [No Anxiety] : not feeling anxious [FreeTextEntry1] : No JVD, no carotid artery bruits auscultated bilaterally

## 2021-02-16 ENCOUNTER — APPOINTMENT (OUTPATIENT)
Dept: CARDIOLOGY | Facility: CLINIC | Age: 61
End: 2021-02-16
Payer: MEDICAID

## 2021-02-16 PROCEDURE — G2066: CPT

## 2021-02-16 PROCEDURE — 93298 REM INTERROG DEV EVAL SCRMS: CPT

## 2021-02-23 ENCOUNTER — NON-APPOINTMENT (OUTPATIENT)
Age: 61
End: 2021-02-23

## 2021-03-01 ENCOUNTER — APPOINTMENT (OUTPATIENT)
Dept: CARDIOLOGY | Facility: CLINIC | Age: 61
End: 2021-03-01
Payer: MEDICAID

## 2021-03-01 PROCEDURE — 99072 ADDL SUPL MATRL&STAF TM PHE: CPT

## 2021-03-01 PROCEDURE — 93306 TTE W/DOPPLER COMPLETE: CPT

## 2021-03-15 ENCOUNTER — NON-APPOINTMENT (OUTPATIENT)
Age: 61
End: 2021-03-15

## 2021-03-19 ENCOUNTER — APPOINTMENT (OUTPATIENT)
Dept: ELECTROPHYSIOLOGY | Facility: CLINIC | Age: 61
End: 2021-03-19

## 2021-03-23 ENCOUNTER — NON-APPOINTMENT (OUTPATIENT)
Age: 61
End: 2021-03-23

## 2021-03-23 ENCOUNTER — APPOINTMENT (OUTPATIENT)
Dept: CARDIOLOGY | Facility: CLINIC | Age: 61
End: 2021-03-23
Payer: MEDICAID

## 2021-03-23 PROCEDURE — G2066: CPT

## 2021-03-23 PROCEDURE — 93298 REM INTERROG DEV EVAL SCRMS: CPT

## 2021-04-19 ENCOUNTER — APPOINTMENT (OUTPATIENT)
Age: 61
End: 2021-04-19
Payer: MEDICAID

## 2021-04-19 PROCEDURE — 0011A: CPT

## 2021-04-27 ENCOUNTER — APPOINTMENT (OUTPATIENT)
Dept: CARDIOLOGY | Facility: CLINIC | Age: 61
End: 2021-04-27
Payer: MEDICAID

## 2021-04-27 ENCOUNTER — NON-APPOINTMENT (OUTPATIENT)
Age: 61
End: 2021-04-27

## 2021-04-27 PROCEDURE — G2066: CPT

## 2021-04-27 PROCEDURE — 93298 REM INTERROG DEV EVAL SCRMS: CPT

## 2021-05-17 ENCOUNTER — APPOINTMENT (OUTPATIENT)
Age: 61
End: 2021-05-17
Payer: MEDICAID

## 2021-05-17 PROCEDURE — 0012A: CPT

## 2021-06-01 ENCOUNTER — APPOINTMENT (OUTPATIENT)
Dept: CARDIOLOGY | Facility: CLINIC | Age: 61
End: 2021-06-01
Payer: MEDICAID

## 2021-06-01 ENCOUNTER — NON-APPOINTMENT (OUTPATIENT)
Age: 61
End: 2021-06-01

## 2021-06-01 PROCEDURE — 93298 REM INTERROG DEV EVAL SCRMS: CPT

## 2021-06-01 PROCEDURE — G2066: CPT

## 2021-06-22 ENCOUNTER — APPOINTMENT (OUTPATIENT)
Dept: ULTRASOUND IMAGING | Facility: CLINIC | Age: 61
End: 2021-06-22
Payer: MEDICAID

## 2021-06-22 PROCEDURE — 76700 US EXAM ABDOM COMPLETE: CPT

## 2021-07-06 ENCOUNTER — APPOINTMENT (OUTPATIENT)
Dept: CARDIOLOGY | Facility: CLINIC | Age: 61
End: 2021-07-06
Payer: MEDICAID

## 2021-07-06 ENCOUNTER — NON-APPOINTMENT (OUTPATIENT)
Age: 61
End: 2021-07-06

## 2021-07-06 PROCEDURE — G2066: CPT

## 2021-07-06 PROCEDURE — 93298 REM INTERROG DEV EVAL SCRMS: CPT

## 2021-07-12 ENCOUNTER — RX RENEWAL (OUTPATIENT)
Age: 61
End: 2021-07-12

## 2021-08-10 ENCOUNTER — NON-APPOINTMENT (OUTPATIENT)
Age: 61
End: 2021-08-10

## 2021-08-10 ENCOUNTER — APPOINTMENT (OUTPATIENT)
Dept: CARDIOLOGY | Facility: CLINIC | Age: 61
End: 2021-08-10
Payer: MEDICAID

## 2021-08-10 PROCEDURE — G2066: CPT

## 2021-08-10 PROCEDURE — 93298 REM INTERROG DEV EVAL SCRMS: CPT

## 2021-08-19 ENCOUNTER — APPOINTMENT (OUTPATIENT)
Dept: CARDIOLOGY | Facility: CLINIC | Age: 61
End: 2021-08-19
Payer: MEDICAID

## 2021-08-19 ENCOUNTER — NON-APPOINTMENT (OUTPATIENT)
Age: 61
End: 2021-08-19

## 2021-08-19 VITALS
OXYGEN SATURATION: 97 % | DIASTOLIC BLOOD PRESSURE: 60 MMHG | TEMPERATURE: 96.9 F | WEIGHT: 225 LBS | HEIGHT: 71 IN | HEART RATE: 64 BPM | BODY MASS INDEX: 31.5 KG/M2 | SYSTOLIC BLOOD PRESSURE: 110 MMHG

## 2021-08-19 PROCEDURE — 99214 OFFICE O/P EST MOD 30 MIN: CPT | Mod: 25

## 2021-08-19 PROCEDURE — 93000 ELECTROCARDIOGRAM COMPLETE: CPT

## 2021-08-19 RX ORDER — FLUOXETINE HYDROCHLORIDE 10 MG/1
10 TABLET ORAL DAILY
Refills: 0 | Status: DISCONTINUED | COMMUNITY
End: 2021-08-19

## 2021-08-19 NOTE — CARDIOLOGY SUMMARY
[de-identified] : 08/19/2021, NSR, normal ECG [de-identified] : ILR:no events. [de-identified] : 3/1/2021, LV EF 55%, mild MR, normal LV diastolic function, mild-moderate TR with estimated PASP of 29mmHg. [de-identified] : Ablation: 2/1/19, Summary:1. Acutely successful ablation for common right atrial flutter, with creation of bidirectional block across the cavotricuspid isthmus (CTI).2. Acute suppression of right Atrial Tachycardia originating in the region of the CristaSummary:1. Acutely successful ablation for common right atrial flutter, with creation of bidirectional block across the cavotricuspid isthmus (CTI).2. Acute suppression of right Atrial Tachycardia originating in the region of the Teri \par

## 2021-08-19 NOTE — HISTORY OF PRESENT ILLNESS
[FreeTextEntry1] : Historical Perspective:\par 60 year old male with history of HLD, HTN, ALISON on CPA, atrial flutter s/p atrial flutter ablation and right atrial (belen) tach. (noted on ILR). He had prior atrial flutter and underwent SCOTTY/DCCV 11/2018 and ILR implanted 12/19/18.\par \par Current Health Status\par Patient with no chest pain, SOB, or palpitations. No hospitalizations since seeing me last. Remains compliant with his medications and reports no adverse effects.\par

## 2021-08-19 NOTE — DISCUSSION/SUMMARY
[FreeTextEntry1] : 1. Atrial Flutter: s/p ablation with no recurrence. Has ILR which is being monitored. Continue Metoprolol (high risk medication with no signs of toxicity). Has been off oral AC. On Aspirin 81mg daily.\par \par 2. Hypertension: \par \par 3. Mitral Valve Regurgitation: mild on March 2021, echocardiogram. Periodic echo surveillance. \par \par 4. HLD: diet controlled. \par \par Follow up in 6 months.

## 2021-09-02 ENCOUNTER — APPOINTMENT (OUTPATIENT)
Dept: OPHTHALMOLOGY | Facility: CLINIC | Age: 61
End: 2021-09-02

## 2021-09-14 ENCOUNTER — NON-APPOINTMENT (OUTPATIENT)
Age: 61
End: 2021-09-14

## 2021-09-14 ENCOUNTER — APPOINTMENT (OUTPATIENT)
Dept: CARDIOLOGY | Facility: CLINIC | Age: 61
End: 2021-09-14
Payer: MEDICAID

## 2021-09-14 PROCEDURE — G2066: CPT

## 2021-09-14 PROCEDURE — 93298 REM INTERROG DEV EVAL SCRMS: CPT

## 2021-10-14 ENCOUNTER — APPOINTMENT (OUTPATIENT)
Dept: OPHTHALMOLOGY | Facility: CLINIC | Age: 61
End: 2021-10-14

## 2021-10-19 ENCOUNTER — APPOINTMENT (OUTPATIENT)
Dept: CARDIOLOGY | Facility: CLINIC | Age: 61
End: 2021-10-19
Payer: MEDICAID

## 2021-10-19 ENCOUNTER — NON-APPOINTMENT (OUTPATIENT)
Age: 61
End: 2021-10-19

## 2021-10-19 PROCEDURE — G2066: CPT

## 2021-10-19 PROCEDURE — 93298 REM INTERROG DEV EVAL SCRMS: CPT

## 2021-11-23 ENCOUNTER — NON-APPOINTMENT (OUTPATIENT)
Age: 61
End: 2021-11-23

## 2021-11-23 ENCOUNTER — APPOINTMENT (OUTPATIENT)
Dept: CARDIOLOGY | Facility: CLINIC | Age: 61
End: 2021-11-23
Payer: MEDICAID

## 2021-11-23 PROCEDURE — G2066: CPT

## 2021-11-23 PROCEDURE — 93298 REM INTERROG DEV EVAL SCRMS: CPT | Mod: NC

## 2021-12-23 ENCOUNTER — APPOINTMENT (OUTPATIENT)
Dept: OPHTHALMOLOGY | Facility: CLINIC | Age: 61
End: 2021-12-23

## 2021-12-28 ENCOUNTER — NON-APPOINTMENT (OUTPATIENT)
Age: 61
End: 2021-12-28

## 2021-12-28 ENCOUNTER — APPOINTMENT (OUTPATIENT)
Dept: CARDIOLOGY | Facility: CLINIC | Age: 61
End: 2021-12-28
Payer: MEDICAID

## 2021-12-28 PROCEDURE — 93298 REM INTERROG DEV EVAL SCRMS: CPT

## 2021-12-28 PROCEDURE — G2066: CPT

## 2022-02-01 ENCOUNTER — NON-APPOINTMENT (OUTPATIENT)
Age: 62
End: 2022-02-01

## 2022-02-01 ENCOUNTER — APPOINTMENT (OUTPATIENT)
Dept: CARDIOLOGY | Facility: CLINIC | Age: 62
End: 2022-02-01
Payer: MEDICAID

## 2022-02-01 PROCEDURE — 93298 REM INTERROG DEV EVAL SCRMS: CPT

## 2022-02-01 PROCEDURE — G2066: CPT

## 2022-02-08 ENCOUNTER — APPOINTMENT (OUTPATIENT)
Dept: CARDIOLOGY | Facility: CLINIC | Age: 62
End: 2022-02-08

## 2022-02-11 ENCOUNTER — NON-APPOINTMENT (OUTPATIENT)
Age: 62
End: 2022-02-11

## 2022-02-11 ENCOUNTER — APPOINTMENT (OUTPATIENT)
Dept: CARDIOLOGY | Facility: CLINIC | Age: 62
End: 2022-02-11
Payer: MEDICAID

## 2022-02-11 VITALS
HEART RATE: 55 BPM | DIASTOLIC BLOOD PRESSURE: 74 MMHG | BODY MASS INDEX: 27.16 KG/M2 | SYSTOLIC BLOOD PRESSURE: 110 MMHG | OXYGEN SATURATION: 97 % | WEIGHT: 194 LBS | HEIGHT: 71 IN

## 2022-02-11 PROCEDURE — 99215 OFFICE O/P EST HI 40 MIN: CPT | Mod: 25

## 2022-02-11 PROCEDURE — 93000 ELECTROCARDIOGRAM COMPLETE: CPT

## 2022-02-11 RX ORDER — ESCITALOPRAM OXALATE 20 MG/1
20 TABLET, FILM COATED ORAL DAILY
Refills: 0 | Status: DISCONTINUED | COMMUNITY
End: 2022-02-11

## 2022-02-11 NOTE — CARDIOLOGY SUMMARY
[de-identified] : 02/11/2022, NSR, possible LAE [de-identified] : ILR:no events. [de-identified] : 3/1/2021, LV EF 55%, mild MR, normal LV diastolic function, mild-moderate TR with estimated PASP of 29mmHg [de-identified] : Ablation: 2/1/19, Summary:1. Acutely successful ablation for common right atrial flutter, with creation of bidirectional block across the cavotricuspid isthmus (CTI).2. Acute suppression of right Atrial Tachycardia originating in the region of the CristaSummary:1. Acutely successful ablation for common right atrial flutter, with creation of bidirectional block across the cavotricuspid isthmus (CTI).2. Acute suppression of right Atrial Tachycardia originating in the region of the Teri \par

## 2022-02-11 NOTE — PHYSICAL EXAM
[Normal] : moves all extremities, no focal deficits, normal speech [de-identified] : No carotid bruits auscultated bilaterally

## 2022-02-11 NOTE — HISTORY OF PRESENT ILLNESS
[FreeTextEntry1] : Historical Perspective:\par 61 year old male with history of HLD, HTN, ALISON on CPA, atrial flutter s/p atrial flutter ablation and right atrial (belen) tach. (noted on ILR). He had prior atrial flutter and underwent SCOTTY/DCCV 11/2018 and ILR implanted 12/19/18.\par \par Current Health Status\par Patient with no chest pain, SOB, or palpitations. No hospitalizations since seeing me last. Remains compliant with his medications and reports no adverse effects. Patient has lost about 30lbs intentionally. Following a Mediterranean diet.

## 2022-02-11 NOTE — DISCUSSION/SUMMARY
[FreeTextEntry1] : 1. Atrial Flutter: s/p ablation with no recurrence. Has ILR which is being monitored. Continue Metoprolol 50mg BID(high risk medication with no signs of toxicity). Has been off oral AC. On Aspirin 81mg daily.\par \par 2. Hypertension: controlled. Goal BP less than 130/80. chlorthalidone 25mg daily, Lopressor 50mg BID\par \par 3. Mitral Valve Regurgitation: mild on March 2021, echocardiogram. Periodic echo surveillance. \par \par 4. HLD: diet controlled. \par \par Follow up in 6 months.

## 2022-02-23 ENCOUNTER — APPOINTMENT (OUTPATIENT)
Dept: CARDIOLOGY | Facility: CLINIC | Age: 62
End: 2022-02-23
Payer: MEDICAID

## 2022-02-23 PROCEDURE — 93306 TTE W/DOPPLER COMPLETE: CPT

## 2022-03-08 ENCOUNTER — NON-APPOINTMENT (OUTPATIENT)
Age: 62
End: 2022-03-08

## 2022-03-08 ENCOUNTER — APPOINTMENT (OUTPATIENT)
Dept: CARDIOLOGY | Facility: CLINIC | Age: 62
End: 2022-03-08
Payer: MEDICAID

## 2022-03-08 PROCEDURE — G2066: CPT

## 2022-03-08 PROCEDURE — 93298 REM INTERROG DEV EVAL SCRMS: CPT

## 2022-04-07 ENCOUNTER — APPOINTMENT (OUTPATIENT)
Dept: OPHTHALMOLOGY | Facility: CLINIC | Age: 62
End: 2022-04-07
Payer: MEDICAID

## 2022-04-07 ENCOUNTER — NON-APPOINTMENT (OUTPATIENT)
Age: 62
End: 2022-04-07

## 2022-04-07 PROCEDURE — 92014 COMPRE OPH EXAM EST PT 1/>: CPT

## 2022-04-12 ENCOUNTER — NON-APPOINTMENT (OUTPATIENT)
Age: 62
End: 2022-04-12

## 2022-04-12 ENCOUNTER — APPOINTMENT (OUTPATIENT)
Dept: CARDIOLOGY | Facility: CLINIC | Age: 62
End: 2022-04-12
Payer: MEDICAID

## 2022-04-12 PROCEDURE — G2066: CPT

## 2022-04-12 PROCEDURE — 93298 REM INTERROG DEV EVAL SCRMS: CPT

## 2022-05-17 ENCOUNTER — NON-APPOINTMENT (OUTPATIENT)
Age: 62
End: 2022-05-17

## 2022-05-17 ENCOUNTER — APPOINTMENT (OUTPATIENT)
Dept: CARDIOLOGY | Facility: CLINIC | Age: 62
End: 2022-05-17
Payer: MEDICAID

## 2022-05-17 PROCEDURE — G2066: CPT

## 2022-05-17 PROCEDURE — 93298 REM INTERROG DEV EVAL SCRMS: CPT

## 2022-06-21 ENCOUNTER — NON-APPOINTMENT (OUTPATIENT)
Age: 62
End: 2022-06-21

## 2022-06-21 ENCOUNTER — APPOINTMENT (OUTPATIENT)
Dept: CARDIOLOGY | Facility: CLINIC | Age: 62
End: 2022-06-21

## 2022-06-21 PROCEDURE — G2066: CPT

## 2022-06-21 PROCEDURE — 93298 REM INTERROG DEV EVAL SCRMS: CPT

## 2022-07-26 ENCOUNTER — NON-APPOINTMENT (OUTPATIENT)
Age: 62
End: 2022-07-26

## 2022-07-26 ENCOUNTER — APPOINTMENT (OUTPATIENT)
Dept: CARDIOLOGY | Facility: CLINIC | Age: 62
End: 2022-07-26

## 2022-07-26 PROCEDURE — G2066: CPT

## 2022-07-26 PROCEDURE — 93298 REM INTERROG DEV EVAL SCRMS: CPT

## 2022-08-16 ENCOUNTER — APPOINTMENT (OUTPATIENT)
Dept: CARDIOLOGY | Facility: CLINIC | Age: 62
End: 2022-08-16

## 2022-08-18 ENCOUNTER — NON-APPOINTMENT (OUTPATIENT)
Age: 62
End: 2022-08-18

## 2022-08-18 ENCOUNTER — APPOINTMENT (OUTPATIENT)
Dept: CARDIOLOGY | Facility: CLINIC | Age: 62
End: 2022-08-18

## 2022-08-18 VITALS
WEIGHT: 190 LBS | HEIGHT: 71 IN | SYSTOLIC BLOOD PRESSURE: 118 MMHG | OXYGEN SATURATION: 99 % | HEART RATE: 50 BPM | DIASTOLIC BLOOD PRESSURE: 60 MMHG | BODY MASS INDEX: 26.6 KG/M2

## 2022-08-18 DIAGNOSIS — Z79.899 OTHER LONG TERM (CURRENT) DRUG THERAPY: ICD-10-CM

## 2022-08-18 DIAGNOSIS — R00.1 BRADYCARDIA, UNSPECIFIED: ICD-10-CM

## 2022-08-18 PROCEDURE — 99215 OFFICE O/P EST HI 40 MIN: CPT | Mod: 25

## 2022-08-18 PROCEDURE — 93000 ELECTROCARDIOGRAM COMPLETE: CPT

## 2022-08-18 RX ORDER — MINOCYCLINE HYDROCHLORIDE 50 MG/1
50 CAPSULE ORAL
Qty: 30 | Refills: 0 | Status: ACTIVE | COMMUNITY
Start: 2022-02-24

## 2022-08-18 RX ORDER — METRONIDAZOLE 7.5 MG/G
0.75 CREAM TOPICAL
Qty: 45 | Refills: 0 | Status: ACTIVE | COMMUNITY
Start: 2022-02-24

## 2022-08-18 RX ORDER — METOPROLOL TARTRATE 50 MG/1
50 TABLET, FILM COATED ORAL
Qty: 180 | Refills: 1 | Status: DISCONTINUED | COMMUNITY
Start: 2021-01-11 | End: 2022-08-18

## 2022-08-18 NOTE — PHYSICAL EXAM
06/29/17 0913   Clinical Encounter Type   Referral To (Referral made to [employer] for [address] communion as requested.)   Sacramental Encounters   Communion Patient wants communion [Normal] : moves all extremities, no focal deficits, normal speech [de-identified] : No carotid bruits auscultated bilaterally

## 2022-08-18 NOTE — HISTORY OF PRESENT ILLNESS
[FreeTextEntry1] : Historical Perspective:\par 61 year old male with history of HLD, HTN, ALISON on CPA, atrial flutter s/p atrial flutter ablation and right atrial (belen) tach. (noted on ILR). He had prior atrial flutter and underwent SCOTTY/DCCV 11/2018 and ILR implanted 12/19/18.\par \par Current Health Status\par Patient with no chest pain, SOB, or palpitations. No hospitalizations since seeing me last. Remains compliant with his medications and reports no adverse effects. Patient has lost about 50lbs intentionally. Following a Mediterranean diet.

## 2022-08-18 NOTE — CARDIOLOGY SUMMARY
[de-identified] : 8/18/2022, Sinus Bradycardia, otherwise normal ECG [de-identified] : ILR:no events. [de-identified] : 3/1/2021, LV EF 55%, mild MR, normal LV diastolic function, mild-moderate TR with estimated PASP of 29mmHg [de-identified] : Ablation: 2/1/19, Summary:1. Acutely successful ablation for common right atrial flutter, with creation of bidirectional block across the cavotricuspid isthmus (CTI).2. Acute suppression of right Atrial Tachycardia originating in the region of the CristaSummary:1. Acutely successful ablation for common right atrial flutter, with creation of bidirectional block across the cavotricuspid isthmus (CTI).2. Acute suppression of right Atrial Tachycardia originating in the region of the Teri \par

## 2022-08-18 NOTE — DISCUSSION/SUMMARY
[FreeTextEntry1] : 1. Atrial Flutter: s/p ablation with no recurrence. Has ILR which is being monitored. On Metoprolol 50mg BID(high risk medication with no signs of toxicity). With Sinus Bradycardia on his ECG to 49bpm. Asymptomatic but not necessary to be that low. Recommend lowering dose to 50mg daily. Will prescribe Toprol XL 50mg daily. Has been off oral AC. On Aspirin 81mg daily.\par \par 2. Hypertension: controlled. Goal BP less than 130/80. chlorthalidone 25mg daily. \par \par 3. Mitral Valve Regurgitation: mild on March 2021, echocardiogram. Periodic echo surveillance. \par \par 4. HLD: diet controlled. \par \par Follow up in 6 months.

## 2022-08-30 ENCOUNTER — NON-APPOINTMENT (OUTPATIENT)
Age: 62
End: 2022-08-30

## 2022-08-30 ENCOUNTER — APPOINTMENT (OUTPATIENT)
Dept: CARDIOLOGY | Facility: CLINIC | Age: 62
End: 2022-08-30

## 2022-08-30 PROCEDURE — 93298 REM INTERROG DEV EVAL SCRMS: CPT

## 2022-08-30 PROCEDURE — G2066: CPT

## 2022-10-04 ENCOUNTER — NON-APPOINTMENT (OUTPATIENT)
Age: 62
End: 2022-10-04

## 2022-10-04 ENCOUNTER — APPOINTMENT (OUTPATIENT)
Dept: CARDIOLOGY | Facility: CLINIC | Age: 62
End: 2022-10-04

## 2022-10-04 PROCEDURE — 93298 REM INTERROG DEV EVAL SCRMS: CPT

## 2022-10-04 PROCEDURE — G2066: CPT

## 2022-11-03 ENCOUNTER — APPOINTMENT (OUTPATIENT)
Dept: CARDIOLOGY | Facility: CLINIC | Age: 62
End: 2022-11-03

## 2022-11-08 ENCOUNTER — APPOINTMENT (OUTPATIENT)
Dept: CARDIOLOGY | Facility: CLINIC | Age: 62
End: 2022-11-08

## 2023-02-16 ENCOUNTER — APPOINTMENT (OUTPATIENT)
Dept: CARDIOLOGY | Facility: CLINIC | Age: 63
End: 2023-02-16

## 2023-03-06 ENCOUNTER — RX RENEWAL (OUTPATIENT)
Age: 63
End: 2023-03-06

## 2023-04-13 ENCOUNTER — APPOINTMENT (OUTPATIENT)
Dept: OPHTHALMOLOGY | Facility: CLINIC | Age: 63
End: 2023-04-13

## 2023-04-28 ENCOUNTER — APPOINTMENT (OUTPATIENT)
Dept: CARDIOLOGY | Facility: CLINIC | Age: 63
End: 2023-04-28
Payer: MEDICAID

## 2023-04-28 VITALS
DIASTOLIC BLOOD PRESSURE: 76 MMHG | SYSTOLIC BLOOD PRESSURE: 120 MMHG | BODY MASS INDEX: 26.6 KG/M2 | WEIGHT: 190 LBS | OXYGEN SATURATION: 98 % | HEART RATE: 71 BPM | HEIGHT: 71 IN

## 2023-04-28 DIAGNOSIS — Z00.00 ENCOUNTER FOR GENERAL ADULT MEDICAL EXAMINATION W/OUT ABNORMAL FINDINGS: ICD-10-CM

## 2023-04-28 PROCEDURE — 93000 ELECTROCARDIOGRAM COMPLETE: CPT

## 2023-04-28 PROCEDURE — 99215 OFFICE O/P EST HI 40 MIN: CPT | Mod: 25

## 2023-04-28 RX ORDER — PRAZOSIN HYDROCHLORIDE 1 MG/1
1 CAPSULE ORAL
Qty: 30 | Refills: 0 | Status: DISCONTINUED | COMMUNITY
Start: 2022-08-11 | End: 2023-04-28

## 2023-04-28 RX ORDER — NALTREXONE HYDROCHLORIDE 50 MG/1
50 TABLET, FILM COATED ORAL
Qty: 30 | Refills: 0 | Status: DISCONTINUED | COMMUNITY
Start: 2022-08-04 | End: 2023-04-28

## 2023-04-28 RX ORDER — PSYLLIUM HUSK 0.4 G
CAPSULE ORAL
Refills: 0 | Status: DISCONTINUED | COMMUNITY
End: 2023-04-28

## 2023-04-28 RX ORDER — MULTIVITAMIN
TABLET ORAL
Refills: 0 | Status: ACTIVE | COMMUNITY

## 2023-04-28 RX ORDER — NAPROXEN 375 MG/1
375 TABLET ORAL
Qty: 30 | Refills: 0 | Status: DISCONTINUED | COMMUNITY
Start: 2022-04-11 | End: 2023-04-28

## 2023-04-28 RX ORDER — ESCITALOPRAM OXALATE 10 MG/1
10 TABLET ORAL
Qty: 30 | Refills: 0 | Status: DISCONTINUED | COMMUNITY
Start: 2021-12-27 | End: 2023-04-28

## 2023-04-28 RX ORDER — ASPIRIN 81 MG
81 TABLET, DELAYED RELEASE (ENTERIC COATED) ORAL
Refills: 0 | Status: DISCONTINUED | COMMUNITY
End: 2023-04-28

## 2023-04-28 RX ORDER — HYDROXYZINE HYDROCHLORIDE 25 MG/1
25 TABLET ORAL
Qty: 90 | Refills: 0 | Status: DISCONTINUED | COMMUNITY
Start: 2022-08-11 | End: 2023-04-28

## 2023-04-28 NOTE — CARDIOLOGY SUMMARY
[de-identified] : 8/18/2022, Sinus Bradycardia, otherwise normal ECG [de-identified] : ILR:no events. [de-identified] : 3/1/2021, LV EF 55%, mild MR, normal LV diastolic function, mild-moderate TR with estimated PASP of 29mmHg [de-identified] : Ablation: 2/1/19, Summary:1. Acutely successful ablation for common right atrial flutter, with creation of bidirectional block across the cavotricuspid isthmus (CTI).2. Acute suppression of right Atrial Tachycardia originating in the region of the CristaSummary:1. Acutely successful ablation for common right atrial flutter, with creation of bidirectional block across the cavotricuspid isthmus (CTI).2. Acute suppression of right Atrial Tachycardia originating in the region of the Teri \par

## 2023-04-28 NOTE — PHYSICAL EXAM
[Normal] : moves all extremities, no focal deficits, normal speech [de-identified] : No carotid bruits auscultated bilaterally

## 2023-04-28 NOTE — REASON FOR VISIT
[Other: ____] : [unfilled] [FreeTextEntry3] : Odell Guillen [FreeTextEntry1] : I saw this 62 year old male with history of HLD, HTN, ALISON on CPA, atrial flutter s/p atrial flutter ablation and right atrial (belen) tach. (noted on ILR). He had prior atrial flutter and underwent SCOTTY/DCCV 11/2018 and ILR implanted 12/19/18.\par \par Current Health Status\par Patient with no chest pain, SOB, or palpitations. No hospitalizations since seeing me last. Remains compliant with his medications and reports no adverse effects. Patient has lost about 50lbs intentionally. Following a Mediterranean diet.\par As result of his weight loss he feels well has no fatigue and his diabetes is easier to control.

## 2023-04-28 NOTE — DISCUSSION/SUMMARY
[FreeTextEntry1] : 1. Atrial Flutter: s/p ablation with no recurrence. Has ILR which is being monitored, but has reached end of life and decided not to replace. On Metoprolol 50mg BID(high risk medication with no signs of toxicity). With Sinus Bradycardia on his ECG to 49bpm. Asymptomatic but not necessary to be that low. Recommend lowering dose to 50mg daily. Will prescribe Toprol XL 50mg daily. Has been off oral AC. On Aspirin 81mg daily.\par \par 2. Hypertension: controlled. Goal BP less than 130/80. chlorthalidone 25mg daily. \par \par 3. Mitral Valve Regurgitation: mild on March 2021, echocardiogram. Periodic echo surveillance. \par \par 4. HLD: diet controlled. \par \par Follow up in 6 months.

## 2023-05-11 ENCOUNTER — RX RENEWAL (OUTPATIENT)
Age: 63
End: 2023-05-11

## 2023-05-11 RX ORDER — METOPROLOL SUCCINATE 50 MG/1
50 TABLET, EXTENDED RELEASE ORAL DAILY
Qty: 90 | Refills: 3 | Status: ACTIVE | COMMUNITY
Start: 2022-08-18 | End: 1900-01-01

## 2023-10-13 ENCOUNTER — APPOINTMENT (OUTPATIENT)
Dept: CARDIOLOGY | Facility: CLINIC | Age: 63
End: 2023-10-13
Payer: MEDICAID

## 2023-10-13 VITALS
DIASTOLIC BLOOD PRESSURE: 64 MMHG | BODY MASS INDEX: 27.02 KG/M2 | OXYGEN SATURATION: 97 % | HEIGHT: 71 IN | WEIGHT: 193 LBS | HEART RATE: 63 BPM | SYSTOLIC BLOOD PRESSURE: 132 MMHG

## 2023-10-13 DIAGNOSIS — I34.0 NONRHEUMATIC MITRAL (VALVE) INSUFFICIENCY: ICD-10-CM

## 2023-10-13 DIAGNOSIS — I48.92 UNSPECIFIED ATRIAL FLUTTER: ICD-10-CM

## 2023-10-13 PROCEDURE — 93000 ELECTROCARDIOGRAM COMPLETE: CPT

## 2023-10-13 PROCEDURE — 99215 OFFICE O/P EST HI 40 MIN: CPT | Mod: 25

## 2023-10-13 RX ORDER — TRIAMCINOLONE ACETONIDE 1 MG/G
0.1 CREAM TOPICAL
Qty: 80 | Refills: 0 | Status: DISCONTINUED | COMMUNITY
Start: 2022-02-24 | End: 2023-10-13

## 2023-10-13 RX ORDER — GLUCOSAMINE/MSM/CHONDROIT SULF 500-166.6
10 TABLET ORAL
Refills: 0 | Status: ACTIVE | COMMUNITY

## 2023-10-13 RX ORDER — VENLAFAXINE 75 MG/1
75 TABLET ORAL DAILY
Refills: 0 | Status: ACTIVE | COMMUNITY

## 2023-10-13 RX ORDER — NYSTATIN 100000 [USP'U]/G
100000 CREAM TOPICAL
Qty: 60 | Refills: 0 | Status: DISCONTINUED | COMMUNITY
Start: 2021-11-02 | End: 2023-10-13

## 2023-10-13 RX ORDER — METRONIDAZOLE 7.5 MG/G
0.75 CREAM TOPICAL
Refills: 0 | Status: DISCONTINUED | COMMUNITY
End: 2023-10-13

## 2023-10-23 ENCOUNTER — RX RENEWAL (OUTPATIENT)
Age: 63
End: 2023-10-23

## 2023-10-23 RX ORDER — CHLORTHALIDONE 25 MG/1
25 TABLET ORAL
Qty: 90 | Refills: 3 | Status: ACTIVE | COMMUNITY
Start: 2019-04-08 | End: 1900-01-01

## 2024-03-14 ENCOUNTER — NON-APPOINTMENT (OUTPATIENT)
Age: 64
End: 2024-03-14

## 2024-03-14 ENCOUNTER — APPOINTMENT (OUTPATIENT)
Dept: OPHTHALMOLOGY | Facility: CLINIC | Age: 64
End: 2024-03-14
Payer: MEDICAID

## 2024-03-14 PROCEDURE — 92014 COMPRE OPH EXAM EST PT 1/>: CPT

## 2024-04-12 ENCOUNTER — APPOINTMENT (OUTPATIENT)
Dept: CARDIOLOGY | Facility: CLINIC | Age: 64
End: 2024-04-12
Payer: MEDICAID

## 2024-04-12 VITALS
HEART RATE: 69 BPM | HEIGHT: 71 IN | DIASTOLIC BLOOD PRESSURE: 60 MMHG | OXYGEN SATURATION: 99 % | BODY MASS INDEX: 27.16 KG/M2 | WEIGHT: 194 LBS | SYSTOLIC BLOOD PRESSURE: 98 MMHG

## 2024-04-12 DIAGNOSIS — Z98.890 OTHER SPECIFIED POSTPROCEDURAL STATES: ICD-10-CM

## 2024-04-12 DIAGNOSIS — E78.5 HYPERLIPIDEMIA, UNSPECIFIED: ICD-10-CM

## 2024-04-12 DIAGNOSIS — E11.9 TYPE 2 DIABETES MELLITUS W/OUT COMPLICATIONS: ICD-10-CM

## 2024-04-12 DIAGNOSIS — I10 ESSENTIAL (PRIMARY) HYPERTENSION: ICD-10-CM

## 2024-04-12 DIAGNOSIS — Z86.79 OTHER SPECIFIED POSTPROCEDURAL STATES: ICD-10-CM

## 2024-04-12 PROCEDURE — G2211 COMPLEX E/M VISIT ADD ON: CPT | Mod: NC,1L

## 2024-04-12 PROCEDURE — 99214 OFFICE O/P EST MOD 30 MIN: CPT

## 2024-04-12 PROCEDURE — 93000 ELECTROCARDIOGRAM COMPLETE: CPT

## 2024-04-12 RX ORDER — OMEGA-3/DHA/EPA/FISH OIL 300-1000MG
CAPSULE ORAL
Refills: 0 | Status: ACTIVE | COMMUNITY

## 2024-04-12 RX ORDER — PSYLLIUM HUSK 0.4 G
CAPSULE ORAL
Refills: 0 | Status: ACTIVE | COMMUNITY

## 2024-04-12 RX ORDER — DIVALPROEX SODIUM 500 MG/1
500 TABLET, DELAYED RELEASE ORAL DAILY
Refills: 0 | Status: ACTIVE | COMMUNITY

## 2024-04-12 RX ORDER — ATORVASTATIN CALCIUM 10 MG/1
10 TABLET, FILM COATED ORAL DAILY
Refills: 0 | Status: ACTIVE | COMMUNITY

## 2024-04-12 RX ORDER — TRAZODONE HYDROCHLORIDE 50 MG/1
50 TABLET ORAL
Refills: 0 | Status: ACTIVE | COMMUNITY

## 2024-04-12 NOTE — CARDIOLOGY SUMMARY
[de-identified] : 8/18/2022, Sinus Bradycardia, otherwise normal ECG [de-identified] : ILR:no events. [de-identified] : 3/1/2021, LV EF 55%, mild MR, normal LV diastolic function, mild-moderate TR with estimated PASP of 29mmHg [de-identified] : Ablation: 2/1/19, Summary:1. Acutely successful ablation for common right atrial flutter, with creation of bidirectional block across the cavotricuspid isthmus (CTI).2. Acute suppression of right Atrial Tachycardia originating in the region of the CristaSummary:1. Acutely successful ablation for common right atrial flutter, with creation of bidirectional block across the cavotricuspid isthmus (CTI).2. Acute suppression of right Atrial Tachycardia originating in the region of the Teri \par

## 2024-04-12 NOTE — PHYSICAL EXAM
[Normal] : moves all extremities, no focal deficits, normal speech [de-identified] : No carotid bruits auscultated bilaterally

## 2024-04-12 NOTE — DISCUSSION/SUMMARY
[FreeTextEntry1] : 1. Atrial Flutter: s/p ablation with no recurrence. Has ILR which is being monitored, but has reached end of life and decided not to replace. On Metoprolol 50mg BID(high risk medication with no signs of toxicity). With Sinus Bradycardia on his ECG to 49bpm. Asymptomatic but not necessary to be that low. Recommend lowering dose to 50mg daily. Will prescribe Toprol XL 50mg daily. Has been off oral AC. On Aspirin 81mg daily.  2. Hypertension: controlled. Goal BP less than 130/80. chlorthalidone 25mg daily. Very well controlled  3. Mitral Valve Regurgitation: mild on March 2021, echocardiogram. Periodic echo surveillance.   4. HLD: on statin therapy. well controlled  5) DM well controlled on meds.(Metformin)  Follow up in 6 months.  [EKG obtained to assist in diagnosis and management of assessed problem(s)] : EKG obtained to assist in diagnosis and management of assessed problem(s)

## 2024-04-12 NOTE — REASON FOR VISIT
[Other: ____] : [unfilled] [FreeTextEntry3] : Odell Guillen [FreeTextEntry1] : I saw this 63 year old male in f/u on 24   history of HLD, HTN, ALISON on CPA, atrial flutter s/p atrial flutter ablation and right atrial (belen) tach. (noted on ILR). He had prior atrial flutter and underwent SCOTTY/DCCV 2018 and ILR implanted 18.  Current Health Status, completely asymptomatic Patient with no chest pain, SOB, or palpitations. No hospitalizations since seeing me last. Remains compliant with his medications and reports no adverse effects. Patient has lost about 50lbs intentionally. Following a Mediterranean diet. As result of his weight loss he feels well has no fatigue and his diabetes is easier to control. He has an  loop recorder and was inquiring about explanting.

## 2024-10-15 LAB — HBA1C MFR BLD HPLC: 5.6

## 2024-10-18 ENCOUNTER — APPOINTMENT (OUTPATIENT)
Dept: CARDIOLOGY | Facility: CLINIC | Age: 64
End: 2024-10-18
Payer: MEDICAID

## 2024-10-18 VITALS
HEART RATE: 72 BPM | HEIGHT: 71 IN | WEIGHT: 187 LBS | DIASTOLIC BLOOD PRESSURE: 72 MMHG | OXYGEN SATURATION: 98 % | BODY MASS INDEX: 26.18 KG/M2 | SYSTOLIC BLOOD PRESSURE: 110 MMHG

## 2024-10-18 DIAGNOSIS — Z79.899 OTHER LONG TERM (CURRENT) DRUG THERAPY: ICD-10-CM

## 2024-10-18 DIAGNOSIS — I10 ESSENTIAL (PRIMARY) HYPERTENSION: ICD-10-CM

## 2024-10-18 DIAGNOSIS — E11.9 TYPE 2 DIABETES MELLITUS W/OUT COMPLICATIONS: ICD-10-CM

## 2024-10-18 DIAGNOSIS — Z98.890 OTHER SPECIFIED POSTPROCEDURAL STATES: ICD-10-CM

## 2024-10-18 DIAGNOSIS — I48.92 UNSPECIFIED ATRIAL FLUTTER: ICD-10-CM

## 2024-10-18 DIAGNOSIS — E78.5 HYPERLIPIDEMIA, UNSPECIFIED: ICD-10-CM

## 2024-10-18 DIAGNOSIS — Z86.79 OTHER SPECIFIED POSTPROCEDURAL STATES: ICD-10-CM

## 2024-10-18 PROCEDURE — 93000 ELECTROCARDIOGRAM COMPLETE: CPT

## 2024-10-18 PROCEDURE — G2211 COMPLEX E/M VISIT ADD ON: CPT | Mod: NC

## 2024-10-18 PROCEDURE — 99215 OFFICE O/P EST HI 40 MIN: CPT

## 2024-10-18 RX ORDER — CHROMIUM 200 MCG
TABLET ORAL
Refills: 0 | Status: ACTIVE | COMMUNITY

## 2024-10-18 RX ORDER — QUETIAPINE FUMARATE 50 MG/1
50 TABLET ORAL DAILY
Refills: 0 | Status: ACTIVE | COMMUNITY

## 2024-10-18 RX ORDER — LAMOTRIGINE 25 MG/1
25 TABLET ORAL TWICE DAILY
Refills: 0 | Status: ACTIVE | COMMUNITY

## 2025-07-18 ENCOUNTER — APPOINTMENT (OUTPATIENT)
Dept: CARDIOLOGY | Facility: CLINIC | Age: 65
End: 2025-07-18

## 2025-08-14 ENCOUNTER — APPOINTMENT (OUTPATIENT)
Dept: OPHTHALMOLOGY | Facility: CLINIC | Age: 65
End: 2025-08-14